# Patient Record
Sex: MALE | Race: WHITE | NOT HISPANIC OR LATINO | Employment: FULL TIME | URBAN - NONMETROPOLITAN AREA
[De-identification: names, ages, dates, MRNs, and addresses within clinical notes are randomized per-mention and may not be internally consistent; named-entity substitution may affect disease eponyms.]

---

## 2017-02-08 ENCOUNTER — OFFICE VISIT (OUTPATIENT)
Dept: FAMILY MEDICINE | Facility: OTHER | Age: 32
End: 2017-02-08
Payer: COMMERCIAL

## 2017-02-08 VITALS
BODY MASS INDEX: 41.75 KG/M2 | HEIGHT: 73 IN | SYSTOLIC BLOOD PRESSURE: 136 MMHG | WEIGHT: 315 LBS | RESPIRATION RATE: 16 BRPM | HEART RATE: 72 BPM | TEMPERATURE: 96.4 F | DIASTOLIC BLOOD PRESSURE: 86 MMHG

## 2017-02-08 DIAGNOSIS — R80.9 PROTEINURIA: ICD-10-CM

## 2017-02-08 DIAGNOSIS — E66.01 MORBID OBESITY WITH BMI OF 45.0-49.9, ADULT (H): ICD-10-CM

## 2017-02-08 DIAGNOSIS — I10 HTN, GOAL BELOW 140/90: ICD-10-CM

## 2017-02-08 DIAGNOSIS — R81 GLUCOSURIA: ICD-10-CM

## 2017-02-08 DIAGNOSIS — E11.8 TYPE 2 DIABETES MELLITUS WITH COMPLICATION, WITHOUT LONG-TERM CURRENT USE OF INSULIN (H): Primary | ICD-10-CM

## 2017-02-08 DIAGNOSIS — R80.9 MICROALBUMINURIA: ICD-10-CM

## 2017-02-08 DIAGNOSIS — E78.5 HYPERLIPIDEMIA LDL GOAL <100: ICD-10-CM

## 2017-02-08 DIAGNOSIS — J34.89 SORE IN NOSE: ICD-10-CM

## 2017-02-08 LAB
ALBUMIN SERPL-MCNC: 3.9 G/DL (ref 3.4–5)
ALP SERPL-CCNC: 61 U/L (ref 40–150)
ALT SERPL W P-5'-P-CCNC: 37 U/L (ref 0–70)
ANION GAP SERPL CALCULATED.3IONS-SCNC: 3 MMOL/L (ref 3–14)
AST SERPL W P-5'-P-CCNC: 21 U/L (ref 0–45)
BILIRUB SERPL-MCNC: 0.8 MG/DL (ref 0.2–1.3)
BUN SERPL-MCNC: 15 MG/DL (ref 7–30)
CALCIUM SERPL-MCNC: 9.2 MG/DL (ref 8.5–10.1)
CHLORIDE SERPL-SCNC: 105 MMOL/L (ref 94–109)
CHOLEST SERPL-MCNC: 189 MG/DL
CO2 SERPL-SCNC: 33 MMOL/L (ref 20–32)
CREAT SERPL-MCNC: 0.97 MG/DL (ref 0.66–1.25)
CREAT UR-MCNC: 85 MG/DL
GFR SERPL CREATININE-BSD FRML MDRD: 89 ML/MIN/1.7M2
GLUCOSE SERPL-MCNC: 99 MG/DL (ref 70–99)
HBA1C MFR BLD: 6.3 % (ref 4.3–6)
HDLC SERPL-MCNC: 46 MG/DL
LDLC SERPL CALC-MCNC: 111 MG/DL
MICROALBUMIN UR-MCNC: 309 MG/L
MICROALBUMIN/CREAT UR: 362.25 MG/G CR (ref 0–17)
NONHDLC SERPL-MCNC: 143 MG/DL
POTASSIUM SERPL-SCNC: 4.2 MMOL/L (ref 3.4–5.3)
PROT SERPL-MCNC: 7.2 G/DL (ref 6.8–8.8)
SODIUM SERPL-SCNC: 141 MMOL/L (ref 133–144)
TRIGL SERPL-MCNC: 159 MG/DL
TSH SERPL DL<=0.005 MIU/L-ACNC: 3.99 MU/L (ref 0.4–4)

## 2017-02-08 PROCEDURE — 82043 UR ALBUMIN QUANTITATIVE: CPT | Performed by: PHYSICIAN ASSISTANT

## 2017-02-08 PROCEDURE — 83036 HEMOGLOBIN GLYCOSYLATED A1C: CPT | Performed by: PHYSICIAN ASSISTANT

## 2017-02-08 PROCEDURE — 80061 LIPID PANEL: CPT | Performed by: PHYSICIAN ASSISTANT

## 2017-02-08 PROCEDURE — 99214 OFFICE O/P EST MOD 30 MIN: CPT | Performed by: PHYSICIAN ASSISTANT

## 2017-02-08 PROCEDURE — 36415 COLL VENOUS BLD VENIPUNCTURE: CPT | Performed by: PHYSICIAN ASSISTANT

## 2017-02-08 PROCEDURE — 84443 ASSAY THYROID STIM HORMONE: CPT | Performed by: PHYSICIAN ASSISTANT

## 2017-02-08 PROCEDURE — 80053 COMPREHEN METABOLIC PANEL: CPT | Performed by: PHYSICIAN ASSISTANT

## 2017-02-08 RX ORDER — MUPIROCIN 20 MG/G
OINTMENT TOPICAL 3 TIMES DAILY
Qty: 22 G | Refills: 1 | Status: SHIPPED | OUTPATIENT
Start: 2017-02-08 | End: 2017-02-13

## 2017-02-08 RX ORDER — LOSARTAN POTASSIUM 25 MG/1
25 TABLET ORAL DAILY
Qty: 90 TABLET | Refills: 1 | Status: SHIPPED | OUTPATIENT
Start: 2017-02-08 | End: 2017-02-08

## 2017-02-08 RX ORDER — LOSARTAN POTASSIUM 25 MG/1
25 TABLET ORAL DAILY
Qty: 90 TABLET | Refills: 1 | Status: SHIPPED | OUTPATIENT
Start: 2017-02-08 | End: 2017-11-14

## 2017-02-08 RX ORDER — LOSARTAN POTASSIUM 25 MG/1
TABLET ORAL
COMMUNITY
End: 2017-02-08

## 2017-02-08 ASSESSMENT — PAIN SCALES - GENERAL: PAINLEVEL: NO PAIN (0)

## 2017-02-08 NOTE — PROGRESS NOTES
"  SUBJECTIVE:                                                    Edenilson Mccartney is a 31 year old male who presents to clinic today for the following health issues:      Diabetes Follow-up      Patient is checking blood sugars: rarely.  Results range are variable    Diabetic concerns: None     Symptoms of hypoglycemia (low blood sugar): none     Paresthesias (numbness or burning in feet) or sores: No     Date of last diabetic eye exam: due       Amount of exercise or physical activity: None    Problems taking medications regularly: No    Medication side effects: none    Diet: diabetic    Hyperlipidemia Follow-Up      Rate your low fat/cholesterol diet?: not monitoring fat    Taking statin?  No    Other lipid medications/supplements?:  none     Hypertension Follow-up      Outpatient blood pressures are not being checked.    Low Salt Diet: not monitoring salt       Problem list and histories reviewed & adjusted, as indicated.  Additional history: as documented    Patient Active Problem List   Diagnosis     Proteinuria     Hyperlipidemia LDL goal <100     Type 2 diabetes mellitus with complication (H)     Obesity (BMI 30-39.9)     HTN, goal below 140/90     Microalbuminuria     History reviewed. No pertinent past surgical history.    Social History   Substance Use Topics     Smoking status: Never Smoker      Smokeless tobacco: Not on file     Alcohol Use: 0.0 oz/week      Comment: occasional     History reviewed. No pertinent family history.        ROS:  Constitutional, HEENT, cardiovascular, pulmonary, GI, , musculoskeletal, neuro, skin, endocrine and psych systems are negative, except as otherwise noted.    OBJECTIVE:                                                    /86 mmHg  Pulse 72  Temp(Src) 96.4  F (35.8  C) (Oral)  Resp 16  Ht 6' 1\" (1.854 m)  Wt 349 lb 8 oz (158.532 kg)  BMI 46.12 kg/m2  Body mass index is 46.12 kg/(m^2).  GENERAL: healthy, alert and no distress  HENT: ear canals and TM's " "normal, nose and mouth without ulcers or lesions  NECK: no adenopathy, no asymmetry, masses, or scars and trachea midline and normal to palpation  RESP: lungs clear to auscultation - no rales, rhonchi or wheezes  CV: regular rate and rhythm, normal S1 S2, no S3 or S4, no murmur, click or rub, no peripheral edema and peripheral pulses strong  ABDOMEN: soft, nontender, no hepatosplenomegaly, no masses and bowel sounds normal  MS: no gross musculoskeletal defects noted, no edema  SKIN: no suspicious lesions or rashes  NEURO: Normal strength and tone, mentation intact and speech normal  PSYCH: mentation appears normal, affect normal/bright  Diabetic foot exam: normal DP and PT pulses, no trophic changes or ulcerative lesions and normal sensory exam    Diagnostic Test Results:  No results found for this or any previous visit (from the past 24 hour(s)).     ASSESSMENT/PLAN:                                                    Diabetes Type II, A1c Controlled, non-insulin dependent   Associated with the following complications    Renal Complications:  None    Ophthalmologic Complications: None    Neurologic Complications: None    Peripheral Vascular Complications:  None    Other: None   Plan:  No changes in the patient's current treatment plan    Hyperlipidemia; control uncertian   Plan:  Labs:   Lipid    Hypertension; Marginal control   Associated with the following complications:    Diabetes   Plan:  Medications:     ACE/ARB - Cozaar 25 mg by mouth daily        BMI:   Estimated body mass index is 46.12 kg/(m^2) as calculated from the following:    Height as of this encounter: 6' 1\" (1.854 m).    Weight as of this encounter: 349 lb 8 oz (158.532 kg).   Weight management plan: Discussed healthy diet and exercise guidelines and patient will follow up in 6 months in clinic to re-evaluate.    (E11.8) Type 2 diabetes mellitus with complication, without long-term current use of insulin (H)  (primary encounter diagnosis)  Comment: " Excellent control today no change in current recommendations  Plan: Hemoglobin A1c, Comprehensive metabolic panel,         Lipid Profile with reflex to direct LDL, TSH         with free T4 reflex, Albumin Random Urine         Quantitative, metFORMIN (GLUCOPHAGE) 500 MG         tablet        Return office visit in 6 months    (R80.9) Microalbuminuria  Comment: Labs pending  Plan: Albumin Random Urine Quantitative        Adjust medications based on results    (I10) HTN, goal below 140/90  Comment: Fair control today.  Plan: Comprehensive metabolic panel, Lipid Profile         with reflex to direct LDL, losartan (COZAAR) 25        MG tablet, DISCONTINUED: losartan (COZAAR) 25         MG tablet        Increase medications to 25 mg daily and recheck in 2 weeks.    (E78.5) Hyperlipidemia LDL goal <100  Comment: Results pending  Plan: Comprehensive metabolic panel, Lipid Profile         with reflex to direct LDL        Adjust medication based on results    (J34.89) Sore in nose  Comment: I do not see this or that he is talking about today upon exam. Advised that he trial medication as noted below.  Plan: mupirocin (BACTROBAN) 2 % ointment        As directed.    (E66.01,  Z68.42) Morbid obesity with BMI of 45.0-49.9, adult (H)  Comment: Prolonged discussion regards to next steps related to a BMI greater than 45 and the presence of diabetes. Advised that he take a careful look at this and make a decision as to what his goals are. Advised weight reduction programs like Lares diet or weight watcher's be considered.  Plan: As directed return office visit in 6 months to check on progress.    Work on weight loss  Regular exercise  6 months    Russ Carrington PA-C  [facility]

## 2017-02-08 NOTE — MR AVS SNAPSHOT
"              After Visit Summary   2/8/2017    Edenilson Mccartney    MRN: [id number]           Patient Information     Date Of Birth          1985        Visit Information        Provider Department      2/8/2017 11:20 AM Russ Juares PA-C [facility]        Today's Diagnoses     Microalbuminuria    -  1     Obesity (BMI 30-39.9)         HTN, goal below 140/90         Hyperlipidemia LDL goal <100         Type 2 diabetes mellitus with complication, without long-term current use of insulin (H)         Sore in nose         Proteinuria         Glucosuria            Follow-ups after your visit        Follow-up notes from your care team     Return in about 2 weeks (around 2/22/2017) for htn recheck.      Who to contact     If you have questions or need follow up information about today's clinic visit or your schedule please contact [facility] directly at [phone].  Normal or non-critical lab and imaging results will be communicated to you by MyChart, letter or phone within 4 business days after the clinic has received the results. If you do not hear from us within 7 days, please contact the clinic through Babyagehart or phone. If you have a critical or abnormal lab result, we will notify you by phone as soon as possible.  Submit refill requests through Startapp or call your pharmacy and they will forward the refill request to us. Please allow 3 business days for your refill to be completed.          Additional Information About Your Visit        MyChart Information     Startapp lets you send messages to your doctor, view your test results, renew your prescriptions, schedule appointments and more. To sign up, go to [email or web address] . Click on \"Log in\" on the left side of the screen, which will take you to the Welcome page. Then click on \"Sign up Now\" on the right side of the page.     You will be asked to enter the access code listed below, as well as some personal information. Please " "follow the directions to create your username and password.     Your access code is: [email or web address]  Expires: 2017 11:54 AM     Your access code will  in 90 days. If you need help or a new code, please call your [facility] clinic or [phone].        Care EveryWhere ID     This is your Care EveryWhere ID. This could be used by other organizations to access your [facility] medical records  [id number]        Your Vitals Were     Pulse Temperature Respirations Height BMI (Body Mass Index)       72 96.4  F (35.8  C) (Oral) 16 6' 1\" (1.854 m) 46.12 kg/m2        Blood Pressure from Last 3 Encounters:   17 136/86   16 142/90   16 150/88    Weight from Last 3 Encounters:   17 349 lb 8 oz (158.532 kg)   16 345 lb (156.491 kg)   08/05/15 302 lb 8 oz (137.213 kg)              We Performed the Following     Albumin Random Urine Quantitative     Comprehensive metabolic panel     Hemoglobin A1c     Lipid Profile with reflex to direct LDL     TSH with free T4 reflex          Today's Medication Changes          These changes are accurate as of: 17 11:54 AM.  If you have any questions, ask your nurse or doctor.               Start taking these medicines.        Dose/Directions    losartan 25 MG tablet   Commonly known as:  COZAAR   Used for:  HTN, goal below 140/90   Started by:  Russ Juares PA-C        Dose:  25 mg   Take 1 tablet (25 mg) by mouth daily   Quantity:  90 tablet   Refills:  1       mupirocin 2 % ointment   Commonly known as:  BACTROBAN   Used for:  Sore in nose   Started by:  Russ Juares PA-C        Apply topically 3 times daily for 5 days   Quantity:  22 g   Refills:  1            Where to get your medicines      These medications were sent to [facility] [place], [place] - [address]   [address] [facility] [address]     Phone:  [phone]    - losartan 25 MG tablet  - metFORMIN 500 MG tablet  - mupirocin 2 % ointment             Primary Care Provider    " Physician No Ref-Primary       No address on file        Thank you!     Thank you for choosing [facility]  for your care. Our goal is always to provide you with excellent care. Hearing back from our patients is one way we can continue to improve our services. Please take a few minutes to complete the written survey that you may receive in the mail after your visit with us. Thank you!             Your Updated Medication List - Protect others around you: Learn how to safely use, store and throw away your medicines at www.disposemymeds.org.          This list is accurate as of: 2/8/17 11:54 AM.  Always use your most recent med list.                   Brand Name Dispense Instructions for use    ASPIRIN NOT PRESCRIBED    INTENTIONAL     Antiplatelet medication not prescribed intentionally due to Not indicated based on age       blood glucose monitoring lancets     1 Box    Use to test blood sugar one time daily or as directed.       blood glucose monitoring test strip    ACCU-CHEK SMARTVIEW    100 strip    Use to test blood sugar one time daily or as directed.       losartan 25 MG tablet    COZAAR    90 tablet    Take 1 tablet (25 mg) by mouth daily       metFORMIN 500 MG tablet    GLUCOPHAGE    90 tablet    Take 0.5 tablets (250 mg) by mouth 2 times daily (with meals)       mupirocin 2 % ointment    BACTROBAN    22 g    Apply topically 3 times daily for 5 days       STATIN NOT PRESCRIBED (INTENTIONAL)      Statin not prescribed intentionally due to Not indicated based on age

## 2017-02-08 NOTE — NURSING NOTE
"Chief Complaint   Patient presents with     Diabetes       Initial /92 mmHg  Pulse 72  Temp(Src) 96.4  F (35.8  C) (Oral)  Resp 16  Ht 6' 1\" (1.854 m)  Wt 349 lb 8 oz (158.532 kg)  BMI 46.12 kg/m2 Estimated body mass index is 46.12 kg/(m^2) as calculated from the following:    Height as of this encounter: 6' 1\" (1.854 m).    Weight as of this encounter: 349 lb 8 oz (158.532 kg).  Medication Reconciliation: complete       Sarah MONZON LPN      Large blood pressure cuff    Screening Questionnaire for Adult Immunization    Are you sick today?   No   Do you have allergies to medications, food, a vaccine component or latex?   No   Have you ever had a serious reaction after receiving a vaccination?   No   Do you have a long-term health problem with heart disease, lung disease, asthma, kidney disease, metabolic disease (e.g. diabetes), anemia, or other blood disorder?   Yes   Do you have cancer, leukemia, HIV/AIDS, or any other immune system problem?   No   In the past 3 months, have you taken medications that affect  your immune system, such as prednisone, other steroids, or anticancer drugs; drugs for the treatment of rheumatoid arthritis, Crohn s disease, or psoriasis; or have you had radiation treatments?   No   Have you had a seizure, or a brain or other nervous system problem?   No   During the past year, have you received a transfusion of blood or blood     products, or been given immune (gamma) globulin or antiviral drug?   No   For women: Are you pregnant or is there a chance you could become        pregnant during the next month?   No   Have you received any vaccinations in the past 4 weeks?   No     Immunization questionnaire was positive for at least one answer.  Notified Russ Carrington PA-C.      MNVFC doesn't apply on this patient    Per orders of  , injection of  given by Sarah Paredes. Patient instructed to remain in clinic for 20 minutes afterwards, and to report any adverse reaction to me " immediately.       Screening performed by Sarah Paredes on 2/8/2017 at 11:22 AM.

## 2017-02-10 NOTE — PROGRESS NOTES
Quick Note:    Contacted patient with results. Patient had no questions or concerns at this time. Mahogany Tellez MA 2/10/2017      ______

## 2017-02-24 ENCOUNTER — ALLIED HEALTH/NURSE VISIT (OUTPATIENT)
Dept: FAMILY MEDICINE | Facility: OTHER | Age: 32
End: 2017-02-24
Payer: COMMERCIAL

## 2017-02-24 VITALS — HEART RATE: 74 BPM | DIASTOLIC BLOOD PRESSURE: 84 MMHG | SYSTOLIC BLOOD PRESSURE: 136 MMHG

## 2017-02-24 DIAGNOSIS — I10 HTN, GOAL BELOW 140/90: Primary | ICD-10-CM

## 2017-02-24 PROCEDURE — 99207 ZZC NO CHARGE NURSE ONLY: CPT

## 2017-02-24 NOTE — NURSING NOTE
Edenilson is a 31 year old male who comes in today for a blood pressure check because of new medication.  Patient is taking medication as prescribed  Patient is tolerating medications well.  Current complaints: none  Patient is not monitoring Blood Pressure elsewhere.      Vitals as recorded, a large cuff was used.  left arm  BP Readings from Last 4 Encounters:   02/24/17 136/84   02/08/17 136/86   06/03/16 142/90   05/25/16 150/88     /84  Pulse 74      Health Maintenance Due   Topic Date Due     FOOT EXAM Q1 YEAR( NO INBASKET)  02/27/1985     EYE EXAM Q1 YEAR( NO INBASKET)  02/27/1985     PNEUMOVAX 1X HI RISK PATIENT < 65 (NO IB MSG)  02/27/1986     INFLUENZA VACCINE (SYSTEM ASSIGNED)  09/01/2016     Mahogany Tellez MA     2/24/2017

## 2017-02-24 NOTE — MR AVS SNAPSHOT
After Visit Summary   2/24/2017    Edenilson Mccartney    MRN: [id number]           Patient Information     Date Of Birth          1985        Visit Information        Provider Department      2/24/2017 1:30 PM TEMI GRACE NURSE, [facility]        Today's Diagnoses     HTN, goal below 140/90    -  1       Follow-ups after your visit        Your next 10 appointments already scheduled     Feb 28, 2017  9:30 AM CST   Nurse Only with TEMI GRACE NURSE, [facility])    [address] [facility] [id number] [phone]            Aug 07, 2017  7:20 AM CDT   Office Visit with Russ Juares PA-C   [facility]    [address] [facility] [address] [phone]           Bring a current list of meds and any records pertaining to this visit.  For Physicals, please bring immunization records and any forms needing to be filled out.  Please arrive 10 minutes early to complete paperwork.              Who to contact     If you have questions or need follow up information about today's clinic visit or your schedule please contact [facility] directly at [phone].  Normal or non-critical lab and imaging results will be communicated to you by MyChart, letter or phone within 4 business days after the clinic has received the results. If you do not hear from us within 7 days, please contact the clinic through Sovihart or phone. If you have a critical or abnormal lab result, we will notify you by phone as soon as possible.  Submit refill requests through Concard or call your pharmacy and they will forward the refill request to us. Please allow 3 business days for your refill to be completed.          Additional Information About Your Visit        SoviharVuzix Information     Concard lets you send messages to your doctor, view your test results, renew your prescriptions, schedule appointments and more. To sign  "up, go to [email or web address] . Click on \"Log in\" on the left side of the screen, which will take you to the Welcome page. Then click on \"Sign up Now\" on the right side of the page.     You will be asked to enter the access code listed below, as well as some personal information. Please follow the directions to create your username and password.     Your access code is: [email or web address]  Expires: 2017 11:54 AM     Your access code will  in 90 days. If you need help or a new code, please call your [facility] clinic or [phone].        Care EveryWhere ID     This is your Care EveryWhere ID. This could be used by other organizations to access your [facility] medical records  [id number]        Your Vitals Were     Pulse                   74            Blood Pressure from Last 3 Encounters:   17 136/84   17 136/86   16 142/90    Weight from Last 3 Encounters:   17 (!) 349 lb 8 oz (158.5 kg)   16 (!) 345 lb (156.5 kg)   08/05/15 (!) 302 lb 8 oz (137.2 kg)              Today, you had the following     No orders found for display       Primary Care Provider    Physician No Ref-Primary       No address on file        Thank you!     Thank you for choosing [facility]  for your care. Our goal is always to provide you with excellent care. Hearing back from our patients is one way we can continue to improve our services. Please take a few minutes to complete the written survey that you may receive in the mail after your visit with us. Thank you!             Your Updated Medication List - Protect others around you: Learn how to safely use, store and throw away your medicines at www.disposemymeds.org.          This list is accurate as of: 17  1:52 PM.  Always use your most recent med list.                   Brand Name Dispense Instructions for use    ASPIRIN NOT PRESCRIBED    INTENTIONAL     Antiplatelet medication not prescribed intentionally due to Not indicated based on " age       blood glucose monitoring lancets     1 Box    Use to test blood sugar one time daily or as directed.       blood glucose monitoring test strip    ACCU-CHEK SMARTVIEW    100 strip    Use to test blood sugar one time daily or as directed.       losartan 25 MG tablet    COZAAR    90 tablet    Take 1 tablet (25 mg) by mouth daily       metFORMIN 500 MG tablet    GLUCOPHAGE    90 tablet    Take 0.5 tablets (250 mg) by mouth 2 times daily (with meals)       STATIN NOT PRESCRIBED (INTENTIONAL)      Statin not prescribed intentionally due to Not indicated based on age

## 2017-02-28 ENCOUNTER — ALLIED HEALTH/NURSE VISIT (OUTPATIENT)
Dept: FAMILY MEDICINE | Facility: OTHER | Age: 32
End: 2017-02-28
Payer: COMMERCIAL

## 2017-02-28 DIAGNOSIS — Z23 NEED FOR PROPHYLACTIC VACCINATION AND INOCULATION AGAINST INFLUENZA: Primary | ICD-10-CM

## 2017-02-28 PROCEDURE — 90472 IMMUNIZATION ADMIN EACH ADD: CPT

## 2017-02-28 PROCEDURE — 90686 IIV4 VACC NO PRSV 0.5 ML IM: CPT

## 2017-02-28 PROCEDURE — 90471 IMMUNIZATION ADMIN: CPT

## 2017-02-28 PROCEDURE — 99207 ZZC NO CHARGE NURSE ONLY: CPT

## 2017-02-28 PROCEDURE — 90732 PPSV23 VACC 2 YRS+ SUBQ/IM: CPT

## 2017-02-28 NOTE — MR AVS SNAPSHOT
"              After Visit Summary   2/28/2017    Edenilson Mccartney    MRN: [id number]           Patient Information     Date Of Birth          1985        Visit Information        Provider Department      2/28/2017 9:30 AM TEMI GRACE NURSE, [facility]        Today's Diagnoses     Need for prophylactic vaccination and inoculation against influenza    -  1       Follow-ups after your visit        Your next 10 appointments already scheduled     Aug 07, 2017  7:20 AM CDT   Office Visit with Russ Juares PA-C   [facility]    [address] [facility] [id number] [phone]           Bring a current list of meds and any records pertaining to this visit.  For Physicals, please bring immunization records and any forms needing to be filled out.  Please arrive 10 minutes early to complete paperwork.              Who to contact     If you have questions or need follow up information about today's clinic visit or your schedule please contact [facility] directly at [phone].  Normal or non-critical lab and imaging results will be communicated to you by Monkimunhart, letter or phone within 4 business days after the clinic has received the results. If you do not hear from us within 7 days, please contact the clinic through Mobibao Technologyt or phone. If you have a critical or abnormal lab result, we will notify you by phone as soon as possible.  Submit refill requests through Anita Margarita or call your pharmacy and they will forward the refill request to us. Please allow 3 business days for your refill to be completed.          Additional Information About Your Visit        MonkimunharDream Industries Information     Anita Margarita lets you send messages to your doctor, view your test results, renew your prescriptions, schedule appointments and more. To sign up, go to [email or web address] . Click on \"Log in\" on the left side of the screen, which will take you to the Welcome page. Then click on " "\"Sign up Now\" on the right side of the page.     You will be asked to enter the access code listed below, as well as some personal information. Please follow the directions to create your username and password.     Your access code is: [email or web address]  Expires: 2017 11:54 AM     Your access code will  in 90 days. If you need help or a new code, please call your [facility] or [phone].        Care EveryWhere ID     This is your Care EveryWhere ID. This could be used by other organizations to access your [facility] medical records  [id number]         Blood Pressure from Last 3 Encounters:   17 136/84   17 136/86   16 142/90    Weight from Last 3 Encounters:   17 (!) 349 lb 8 oz (158.5 kg)   16 (!) 345 lb (156.5 kg)   08/05/15 (!) 302 lb 8 oz (137.2 kg)              We Performed the Following     FLU VAC, SPLIT VIRUS IM > 3 YO (QUADRIVALENT) [48112]     PNEUMOVOCCAL VACCINE 23 VALENT (PNEUMOVAX 23) >=2 YRS  [09938]     Vaccine Administration, Each Additional [06918]     Vaccine Administration, Initial [12658]        Primary Care Provider    None Specified       No primary provider on file.        Thank you!     Thank you for choosing [facility]  for your care. Our goal is always to provide you with excellent care. Hearing back from our patients is one way we can continue to improve our services. Please take a few minutes to complete the written survey that you may receive in the mail after your visit with us. Thank you!             Your Updated Medication List - Protect others around you: Learn how to safely use, store and throw away your medicines at www.disposemymeds.org.          This list is accurate as of: 17 10:05 AM.  Always use your most recent med list.                   Brand Name Dispense Instructions for use    ASPIRIN NOT PRESCRIBED    INTENTIONAL     Antiplatelet medication not prescribed intentionally due to Not indicated based on age       " blood glucose monitoring lancets     1 Box    Use to test blood sugar one time daily or as directed.       blood glucose monitoring test strip    ACCU-CHEK SMARTVIEW    100 strip    Use to test blood sugar one time daily or as directed.       losartan 25 MG tablet    COZAAR    90 tablet    Take 1 tablet (25 mg) by mouth daily       metFORMIN 500 MG tablet    GLUCOPHAGE    90 tablet    Take 0.5 tablets (250 mg) by mouth 2 times daily (with meals)       STATIN NOT PRESCRIBED (INTENTIONAL)      Statin not prescribed intentionally due to Not indicated based on age

## 2017-02-28 NOTE — PROGRESS NOTES
Injectable Influenza Immunization Documentation    1.  Is the person to be vaccinated sick today?  No    2. Does the person to be vaccinated have an allergy to eggs or to a component of the vaccine?  No    3. Has the person to be vaccinated today ever had a serious reaction to influenza vaccine in the past?  No    4. Has the person to be vaccinated ever had Guillain-R[place] syndrome?  No     Form completed by Mahogany Tellez MA     2/28/2017

## 2017-02-28 NOTE — NURSING NOTE
Screening Questionnaire for Adult Immunization    Are you sick today?   No   Do you have allergies to medications, food, a vaccine component or latex?   No   Have you ever had a serious reaction after receiving a vaccination?   No   Do you have a long-term health problem with heart disease, lung disease, asthma, kidney disease, metabolic disease (e.g. diabetes), anemia, or other blood disorder?   No   Do you have cancer, leukemia, HIV/AIDS, or any other immune system problem?   No   In the past 3 months, have you taken medications that affect  your immune system, such as prednisone, other steroids, or anticancer drugs; drugs for the treatment of rheumatoid arthritis, Crohn s disease, or psoriasis; or have you had radiation treatments?   No   Have you had a seizure, or a brain or other nervous system problem?   No   During the past year, have you received a transfusion of blood or blood     products, or been given immune (gamma) globulin or antiviral drug?   No   For women: Are you pregnant or is there a chance you could become        pregnant during the next month?   No   Have you received any vaccinations in the past 4 weeks?   No     Immunization questionnaire answers were all negative.      MNVFC doesn't apply on this patient    Prior to injection verified patient identity using patient's name and date of birth.  Patient instructed to remain in clinic for 20 minutes afterwards, and to report any adverse reaction to me immediately.       Screening performed by Mahogany Tellez on 2/28/2017 at 9:58 AM.

## 2017-10-10 ENCOUNTER — OFFICE VISIT (OUTPATIENT)
Dept: URGENT CARE | Facility: RETAIL CLINIC | Age: 32
End: 2017-10-10
Payer: COMMERCIAL

## 2017-10-10 VITALS
OXYGEN SATURATION: 98 % | DIASTOLIC BLOOD PRESSURE: 101 MMHG | TEMPERATURE: 98.1 F | SYSTOLIC BLOOD PRESSURE: 164 MMHG | HEART RATE: 86 BPM

## 2017-10-10 DIAGNOSIS — I10 HTN, GOAL BELOW 140/90: ICD-10-CM

## 2017-10-10 DIAGNOSIS — J02.9 ACUTE PHARYNGITIS, UNSPECIFIED ETIOLOGY: Primary | ICD-10-CM

## 2017-10-10 LAB — S PYO AG THROAT QL IA.RAPID: NORMAL

## 2017-10-10 PROCEDURE — 87081 CULTURE SCREEN ONLY: CPT | Performed by: NURSE PRACTITIONER

## 2017-10-10 PROCEDURE — 87880 STREP A ASSAY W/OPTIC: CPT | Mod: QW | Performed by: NURSE PRACTITIONER

## 2017-10-10 PROCEDURE — 99213 OFFICE O/P EST LOW 20 MIN: CPT | Performed by: NURSE PRACTITIONER

## 2017-10-10 NOTE — PROGRESS NOTES
[facility] note    SUBJECTIVE:  Edenilson Mccartney is a 32 year old male who presents to [facility] clinic with chief complaint of sore throat.    Onset of symptoms was 1 day(s) ago.    Course of illness: sudden onset.    Severity mild  Course of illness:  Current and Associated symptoms: stuffy nose, sore throat, hoarse voice, facial pain/pressure and headache  Treatment measures tried at home include None tried.  Predisposing factors include exposure to strep.    Current Outpatient Prescriptions   Medication     losartan (COZAAR) 25 MG tablet     metFORMIN (GLUCOPHAGE) 500 MG tablet     STATIN NOT PRESCRIBED, INTENTIONAL,     ASPIRIN NOT PRESCRIBED, INTENTIONAL,     blood glucose monitoring (ACCU-CHEK FASTCLIX) lancets     blood glucose (ACCU-CHEK SMARTVIEW) test strip     No current facility-administered medications for this visit.      PAST MEDICAL HISTORY:   Past Medical History:   Diagnosis Date     Diabetes (H)      HTN, goal below 140/90 8/5/2015     Hyperlipidemia LDL goal <100 2/25/2015     Microalbuminuria 10/14/2015     Morbid obesity with BMI of 45.0-49.9, adult (H) 2/25/2015     Obesity (BMI 30-39.9) 8/5/2015     Other acne     Acne     Proteinuria 2/25/2015     Type 2 diabetes mellitus with complication, without long-term current use of insulin (H) 2/8/2017     Type 2 diabetes, HbA1c goal < 7% (H) 2/25/2015       PAST SURGICAL HISTORY: No past surgical history on file.    FAMILY HISTORY: No family history on file.    SOCIAL HISTORY:   Social History   Substance Use Topics     Smoking status: Never Smoker     Smokeless tobacco: Not on file     Alcohol use 0.0 oz/week      Comment: occasional     ROS:  Review of systems negative except as stated above.    OBJECTIVE:   Vitals:    10/10/17 1827   BP: (!) 164/101   Pulse: 86   Temp: 98.1  F (36.7  C)   TempSrc: Oral   SpO2: 98%     GENERAL APPEARANCE: alert, active, mild distress, cooperative and over  weight  EYES: EOMI,  PERRL, conjunctiva clear  HENT: ear canals and TM's normal.  Nose normal.  oral mucous membranes moist, no erythema noted  NECK: bilateral anterior cervical adenopathy  RESP: lungs clear to auscultation - no rales, rhonchi or wheezes  CV: regular rates and rhythm, normal S1 S2, no murmur noted  ABDOMEN:  soft, nontender, no HSM or masses and bowel sounds normal  SKIN: no suspicious lesions or rashes  NEURO: Normal strength and tone, sensory exam grossly normal,  normal speech and mentation    Rapid Strep test is negative; await throat culture results.    ASSESSMENT:  Acute pharyngitis, unspecified etiology      PLAN:   Outpatient Encounter Prescriptions as of 10/10/2017   Medication Sig Dispense Refill     losartan (COZAAR) 25 MG tablet Take 1 tablet (25 mg) by mouth daily (Patient not taking: Reported on 10/10/2017) 90 tablet 1     metFORMIN (GLUCOPHAGE) 500 MG tablet Take 0.5 tablets (250 mg) by mouth 2 times daily (with meals) (Patient not taking: Reported on 10/10/2017) 90 tablet 1     STATIN NOT PRESCRIBED, INTENTIONAL, Statin not prescribed intentionally due to Not indicated based on age       ASPIRIN NOT PRESCRIBED, INTENTIONAL, Antiplatelet medication not prescribed intentionally due to Not indicated based on age       blood glucose monitoring (ACCU-CHEK FASTCLIX) lancets Use to test blood sugar one time daily or as directed. (Patient not taking: Reported on 10/10/2017) 1 Box prn     blood glucose (ACCU-CHEK SMARTVIEW) test strip Use to test blood sugar one time daily or as directed. (Patient not taking: Reported on 10/10/2017) 100 strip prn     No facility-administered encounter medications on file as of 10/10/2017.    Blood Pressure reviewed & discussed, Edenilson Mccartney advised to F/U with PCP.    If not improving Follow up at:  [facility] [phone]  Encourage good hydration (mainly water), may drink tea /c honey, warm chicken broth to sooth throat.  Soft  foods may be preferred for several days.  Symptomatic treatment with warm Na+ H2O gargles, OTC analgesic, etc. discussed.   Strep culture pending.   Edenilson Mccartney told positive cultures called only.  Rest as needed.  Follow-up with primary care provider if not improving.    If difficulty breathing or swallowing be seen immediately in the ED.    Toby Young MSN, APRN, Family NP-C  [facility]

## 2017-10-10 NOTE — MR AVS SNAPSHOT
"              After Visit Summary   10/10/2017    Edenilson Mccartney    MRN: [id number]           Patient Information     Date Of Birth          1985        Visit Information        Provider Department      10/10/2017 6:30 PM Toby Young, TREY [facility]        Today's Diagnoses     Acute pharyngitis, unspecified etiology    -  1    HTN, goal below 140/90           Follow-ups after your visit        Who to contact     You can reach your care team any time of the day by calling [phone].  Notification of test results:  If you have an abnormal lab result, we will notify you by phone as soon as possible.         Additional Information About Your Visit        MyChart Information     [employer] lets you send messages to your doctor, view your test results, renew your prescriptions, schedule appointments and more. To sign up, go to [email or web address] . Click on \"Log in\" on the left side of the screen, which will take you to the Welcome page. Then click on \"Sign up Now\" on the right side of the page.     You will be asked to enter the access code listed below, as well as some personal information. Please follow the directions to create your username and password.     Your access code is: [email or web address]  Expires: 2018  6:44 PM     Your access code will  in 90 days. If you need help or a new code, please call your [place] clinic or [phone].        Care EveryWhere ID     This is your [facility] EveryWhere ID. This could be used by other organizations to access your [place] medical records  [id number]        Your Vitals Were     Pulse Temperature Pulse Oximetry             86 98.1  F (36.7  C) (Oral) 98%          Blood Pressure from Last 3 Encounters:   10/10/17 (!) 164/101   17 136/84   17 136/86    Weight from Last 3 Encounters:   17 (!) 349 lb 8 oz (158.5 kg)   16 (!) 345 lb (156.5 kg)   08/05/15 (!) 302 lb 8 oz (137.2 kg)              We Performed " the Following     BETA STREP GROUP A R/O CULTURE     RAPID STREP SCREEN        Primary Care Provider Office Phone # Fax #    [facility] [phone] [address] [facility] [address]        Equal Access to Services     NIKO VIRK : Haddelta marcy ku biancao Soomaali, waaxda luqadaha, qaybta kaalmada adeegyada, waxsaba garcian su hurley laJohannapravin broussard. So [facility] [phone].    ATENCIÓN: Si habla español, tiene a lewis disposición servicios gratuitos de asistencia lingüística. Llame al [phone].    We comply with applicable federal civil rights laws and [place] laws. We do not discriminate on the basis of race, color, national origin, age, disability, sex, sexual orientation, or gender identity.            Thank you!     Thank you for choosing [facility]  for your care. Our goal is always to provide you with excellent care. Hearing back from our patients is one way we can continue to improve our services. Please take a few minutes to complete the written survey that you may receive in the mail after your visit with us. Thank you!             Your Updated Medication List - Protect others around you: Learn how to safely use, store and throw away your medicines at www.disposemymeds.org.          This list is accurate as of: 10/10/17  6:44 PM.  Always use your most recent med list.                   Brand Name Dispense Instructions for use Diagnosis    ASPIRIN NOT PRESCRIBED    INTENTIONAL     Antiplatelet medication not prescribed intentionally due to Not indicated based on age        blood glucose monitoring lancets     1 Box    Use to test blood sugar one time daily or as directed.    Type 2 diabetes, HbA1c goal < 7% (H)       blood glucose monitoring test strip    ACCU-CHEK SMARTVIEW    100 strip    Use to test blood sugar one time daily or as directed.    Type 2 diabetes, HbA1c goal < 7% (H)       losartan 25 MG tablet    COZAAR    90 tablet    Take 1  tablet (25 mg) by mouth daily    HTN, goal below 140/90       metFORMIN 500 MG tablet    GLUCOPHAGE    90 tablet    Take 0.5 tablets (250 mg) by mouth 2 times daily (with meals)    Type 2 diabetes mellitus with complication, without long-term current use of insulin (H)       STATIN NOT PRESCRIBED (INTENTIONAL)      Statin not prescribed intentionally due to Not indicated based on age

## 2017-10-10 NOTE — NURSING NOTE
"Chief Complaint   Patient presents with     Pharyngitis     started today       Initial BP (!) 164/101  Pulse 86  Temp 98.1  F (36.7  C) (Oral)  SpO2 98% Estimated body mass index is 46.11 kg/(m^2) as calculated from the following:    Height as of 2/8/17: 6' 1\" (1.854 m).    Weight as of 2/8/17: 349 lb 8 oz (158.5 kg).  Medication Reconciliation: complete   Rin Junior      "

## 2017-10-12 LAB — BETA STREP CONFIRM: NORMAL

## 2017-10-27 ENCOUNTER — RADIANT APPOINTMENT (OUTPATIENT)
Dept: GENERAL RADIOLOGY | Facility: CLINIC | Age: 32
End: 2017-10-27
Attending: NURSE PRACTITIONER
Payer: COMMERCIAL

## 2017-10-27 ENCOUNTER — OFFICE VISIT (OUTPATIENT)
Dept: FAMILY MEDICINE | Facility: CLINIC | Age: 32
End: 2017-10-27
Payer: COMMERCIAL

## 2017-10-27 VITALS
WEIGHT: 315 LBS | TEMPERATURE: 98.7 F | RESPIRATION RATE: 14 BRPM | OXYGEN SATURATION: 98 % | BODY MASS INDEX: 46.27 KG/M2 | HEART RATE: 92 BPM | DIASTOLIC BLOOD PRESSURE: 66 MMHG | SYSTOLIC BLOOD PRESSURE: 124 MMHG

## 2017-10-27 DIAGNOSIS — R10.12 ABDOMINAL PAIN, LEFT UPPER QUADRANT: Primary | ICD-10-CM

## 2017-10-27 DIAGNOSIS — Z23 NEED FOR PROPHYLACTIC VACCINATION AND INOCULATION AGAINST INFLUENZA: ICD-10-CM

## 2017-10-27 DIAGNOSIS — E66.01 MORBID OBESITY WITH BMI OF 45.0-49.9, ADULT (H): ICD-10-CM

## 2017-10-27 DIAGNOSIS — E11.8 TYPE 2 DIABETES MELLITUS WITH COMPLICATION, WITHOUT LONG-TERM CURRENT USE OF INSULIN (H): ICD-10-CM

## 2017-10-27 LAB
ANION GAP SERPL CALCULATED.3IONS-SCNC: 4 MMOL/L (ref 3–14)
BUN SERPL-MCNC: 19 MG/DL (ref 7–30)
CALCIUM SERPL-MCNC: 8.8 MG/DL (ref 8.5–10.1)
CHLORIDE SERPL-SCNC: 102 MMOL/L (ref 94–109)
CO2 SERPL-SCNC: 32 MMOL/L (ref 20–32)
CREAT SERPL-MCNC: 1.09 MG/DL (ref 0.66–1.25)
ERYTHROCYTE [DISTWIDTH] IN BLOOD BY AUTOMATED COUNT: 13.5 % (ref 10–15)
GFR SERPL CREATININE-BSD FRML MDRD: 78 ML/MIN/1.7M2
GLUCOSE SERPL-MCNC: 117 MG/DL (ref 70–99)
HBA1C MFR BLD: 6.2 % (ref 4.3–6)
HCT VFR BLD AUTO: 40.1 % (ref 40–53)
HGB BLD-MCNC: 13.4 G/DL (ref 13.3–17.7)
LIPASE SERPL-CCNC: 241 U/L (ref 73–393)
MCH RBC QN AUTO: 28.9 PG (ref 26.5–33)
MCHC RBC AUTO-ENTMCNC: 33.4 G/DL (ref 31.5–36.5)
MCV RBC AUTO: 86 FL (ref 78–100)
PLATELET # BLD AUTO: 333 10E9/L (ref 150–450)
POTASSIUM SERPL-SCNC: 4.2 MMOL/L (ref 3.4–5.3)
RBC # BLD AUTO: 4.64 10E12/L (ref 4.4–5.9)
SODIUM SERPL-SCNC: 138 MMOL/L (ref 133–144)
WBC # BLD AUTO: 9.9 10E9/L (ref 4–11)

## 2017-10-27 PROCEDURE — 83036 HEMOGLOBIN GLYCOSYLATED A1C: CPT | Performed by: NURSE PRACTITIONER

## 2017-10-27 PROCEDURE — 90471 IMMUNIZATION ADMIN: CPT | Performed by: NURSE PRACTITIONER

## 2017-10-27 PROCEDURE — 90686 IIV4 VACC NO PRSV 0.5 ML IM: CPT | Performed by: NURSE PRACTITIONER

## 2017-10-27 PROCEDURE — 99214 OFFICE O/P EST MOD 30 MIN: CPT | Mod: 25 | Performed by: NURSE PRACTITIONER

## 2017-10-27 PROCEDURE — 74020 XR ABDOMEN 2 VW: CPT

## 2017-10-27 PROCEDURE — 80048 BASIC METABOLIC PNL TOTAL CA: CPT | Performed by: NURSE PRACTITIONER

## 2017-10-27 PROCEDURE — 83690 ASSAY OF LIPASE: CPT | Performed by: NURSE PRACTITIONER

## 2017-10-27 PROCEDURE — 85027 COMPLETE CBC AUTOMATED: CPT | Performed by: NURSE PRACTITIONER

## 2017-10-27 PROCEDURE — 36415 COLL VENOUS BLD VENIPUNCTURE: CPT | Performed by: NURSE PRACTITIONER

## 2017-10-27 NOTE — LETTER
[address]  [address]  [phone]    October 30, 2017      Edenilson Mccartney  [address]  [facility] [address]            Mr. Mccartney,    Your abdominal x-ray was negative for reasons for your abdominal pain. There was tiny calcifications (build-up) near your right kidney but it appears that is benign (not serious). This would likely get clarified during your abdominal ultrasound as to where those truly are. Otherwise normal x-ray.    Please contact the clinic if you have additional questions.  Thank you.    Sincerely,    TREY Moore, NP-C

## 2017-10-27 NOTE — MR AVS SNAPSHOT
"              After Visit Summary   10/27/2017    Edenilson Mccartney    MRN: [id number]           Patient Information     Date Of Birth          1985        Visit Information        Provider Department      10/27/2017 2:40 PM Dori Bryant NP [facility]        Today's Diagnoses     Abdominal pain, left upper quadrant    -  1    Morbid obesity with BMI of 45.0-49.9, adult (H)        Type 2 diabetes mellitus with complication, without long-term current use of insulin (H)        Need for prophylactic vaccination and inoculation against influenza          Care Instructions    US: [phone]    Go to ER if anything worsen.           Follow-ups after your visit        Future tests that were ordered for you today     Open Future Orders        Priority Expected Expires Ordered    H Pylori antigen stool Routine  11/26/2017 10/27/2017    US Abdomen Complete Routine  10/27/2018 10/27/2017            Who to contact     If you have questions or need follow up information about today's clinic visit or your schedule please contact [facility] directly at [phone].  Normal or non-critical lab and imaging results will be communicated to you by Citizensidehart, letter or phone within 4 business days after the clinic has received the results. If you do not hear from us within 7 days, please contact the clinic through [employer] or phone. If you have a critical or abnormal lab result, we will notify you by phone as soon as possible.  Submit refill requests through [employer] or call your pharmacy and they will forward the refill request to us. Please allow 3 business days for your refill to be completed.          Additional Information About Your Visit        CitizensideharALGAentis Information     [employer] lets you send messages to your doctor, view your test results, renew your prescriptions, schedule appointments and more. To sign up, go to [email or web address] . Click on \"Log in\" on the left side of the screen, which " "will take you to the Welcome page. Then click on \"Sign up Now\" on the right side of the page.     You will be asked to enter the access code listed below, as well as some personal information. Please follow the directions to create your username and password.     Your access code is: [email or web address]  Expires: 2018  6:44 PM     Your access code will  in 90 days. If you need help or a new code, please call your [facility] clinic or [phone].        Care EveryWhere ID     This is your Care EveryWhere ID. This could be used by other organizations to access your [place] medical records  [id number]        Your Vitals Were     Pulse Temperature Respirations Pulse Oximetry BMI (Body Mass Index)       92 98.7  F (37.1  C) (Oral) 14 98% 46.27 kg/m2        Blood Pressure from Last 3 Encounters:   10/27/17 124/66   10/10/17 (!) 164/101   17 136/84    Weight from Last 3 Encounters:   10/27/17 (!) 159.1 kg (350 lb 11.2 oz)   17 (!) 158.5 kg (349 lb 8 oz)   16 (!) 156.5 kg (345 lb)              We Performed the Following     Basic metabolic panel     CBC with platelets     Hemoglobin A1c     Lipase     XR Abdomen 2 Views        Primary Care Provider Office Phone # Fax #    [facility] [phone] [address] [facility] [address]        Equal Access to Services     NIKO VIRK : Hadii marcy ku hadasho Soomaali, waaxda luqadaha, qaybta kaalmada adeegyada, kavon mariscal . So [facility] [phone].    ATENCIÓN: Si habla español, tiene a lewis disposición servicios gratuitos de asistencia lingüística. Llame al [phone].    We comply with applicable federal civil rights laws and [place] laws. We do not discriminate on the basis of race, color, national origin, age, disability, sex, sexual orientation, or gender identity.            Thank you!     Thank you for choosing [facility]  for your care. Our goal is always to provide " you with excellent care. Hearing back from our patients is one way we can continue to improve our services. Please take a few minutes to complete the written survey that you may receive in the mail after your visit with us. Thank you!             Your Updated Medication List - Protect others around you: Learn how to safely use, store and throw away your medicines at www.disposemymeds.org.          This list is accurate as of: 10/27/17  3:35 PM.  Always use your most recent med list.                   Brand Name Dispense Instructions for use Diagnosis    ASPIRIN NOT PRESCRIBED    INTENTIONAL     Antiplatelet medication not prescribed intentionally due to Not indicated based on age        blood glucose monitoring lancets     1 Box    Use to test blood sugar one time daily or as directed.    Type 2 diabetes, HbA1c goal < 7% (H)       blood glucose monitoring test strip    ACCU-CHEK SMARTVIEW    100 strip    Use to test blood sugar one time daily or as directed.    Type 2 diabetes, HbA1c goal < 7% (H)       losartan 25 MG tablet    COZAAR    90 tablet    Take 1 tablet (25 mg) by mouth daily    HTN, goal below 140/90       metFORMIN 500 MG tablet    GLUCOPHAGE    90 tablet    Take 0.5 tablets (250 mg) by mouth 2 times daily (with meals)    Type 2 diabetes mellitus with complication, without long-term current use of insulin (H)

## 2017-10-27 NOTE — PROGRESS NOTES
SUBJECTIVE:   Edenilson Mccartney is a 32 year old male who presents to clinic today for the following health issues:      ABDOMINAL   PAIN     Onset: Monday    Description:   Character: Sharp  Location: left upper quadrant and  Upper abdomen  Radiation: None    Intensity: moderate    Progression of Symptoms:  worsening    Accompanying Signs & Symptoms:  Fever/Chills?: no   Gas/Bloating: YES- bloating  Nausea: no   Vomitting: no   Diarrhea?: YES  Constipation:YES- possibly and took a laxative  Dysuria or Hematuria: no    History:   Trauma: no   Previous similar pain: no    Previous tests done: none    Precipitating factors:   Does the pain change with:     Food: YES     BM: no     Urination: no     Alleviating factors:  Sitting in a recliner    Therapies Tried and outcome: rolaids, tums, john laxative but none of that is working    LMP:  not applicable     Monday had BM, soon after had that he started having some pain. Will go away for some time, barely noticeable. Then it will come back very painful.  It started in the mid-epigastric area then moved to the left. Now just in the left upper quadrant.  Gets a little worse after eating. Laying flat makes the pain worse. LBM today-small. Having daily small amounts. No issues urinating. +bloating/gas. Changed diet recently: eating more fruits and vegetables. Hunching over makes it better.  Denies pain into left shoulder or chest. Denies trauma to abdomen or chest.    Diabetes: last A1C 6.3 on 2/8/17. Diet, exercise not going well. No issues taking his metformin. Just started making dietary changes.  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 He is obese.         Problem list and histories reviewed & adjusted, as indicated.  Additional history: as documented    Patient Active Problem List   Diagnosis     Proteinuria     Hyperlipidemia LDL goal <100     Morbid obesity with BMI of 45.0-49.9, adult (H)     HTN, goal below 140/90      Microalbuminuria     Type 2 diabetes mellitus with complication, without long-term current use of insulin (H)     History reviewed. No pertinent surgical history.    Social History   Substance Use Topics     Smoking status: Never Smoker     Smokeless tobacco: Never Used     Alcohol use 0.0 oz/week      Comment: occasional     History reviewed. No pertinent family history.      Current Outpatient Prescriptions   Medication Sig Dispense Refill     losartan (COZAAR) 25 MG tablet Take 1 tablet (25 mg) by mouth daily 90 tablet 1     metFORMIN (GLUCOPHAGE) 500 MG tablet Take 0.5 tablets (250 mg) by mouth 2 times daily (with meals) 90 tablet 1     ASPIRIN NOT PRESCRIBED, INTENTIONAL, Antiplatelet medication not prescribed intentionally due to Not indicated based on age       blood glucose monitoring (ACCU-CHEK FASTCLIX) lancets Use to test blood sugar one time daily or as directed. 1 Box prn     blood glucose (ACCU-CHEK SMARTVIEW) test strip Use to test blood sugar one time daily or as directed. 100 strip prn     Allergies   Allergen Reactions     No Known Drug Allergies          Reviewed and updated as needed this visit by clinical staffTobacco  Allergies  Meds  Problems  Med Hx  Surg Hx  Fam Hx  Soc Hx        Reviewed and updated as needed this visit by Provider  Allergies  Meds  Problems         ROS:  Constitutional, cardiovascular, pulmonary, gi and gu systems are positive as noted in HPI.      OBJECTIVE:   /66 (BP Location: Right arm, Patient Position: Sitting, Cuff Size: Adult Large)  Pulse 92  Temp 98.7  F (37.1  C) (Oral)  Resp 14  Wt (!) 159.1 kg (350 lb 11.2 oz)  SpO2 98%  BMI 46.27 kg/m2  Body mass index is 46.27 kg/(m^2).  GENERAL: healthy but tired appearing, alert and no distress  RESP: lungs clear to auscultation - no rales, rhonchi or wheezes. No SOB  CV: regular rate and rhythm, normal S1 S2, no S3 or S4, no murmur, click or rub. No chest pain  ABDOMEN: soft, obese, tender in mid  epigastric to left upper quadrant to left middle quadrant. no hepatosplenomegaly-DIANE due to body habitus, no masses and bowel sounds normal  MS: no gross musculoskeletal defects noted, no edema  Neuro: denies neuropathy    Diagnostic Test Results:  No results found for this or any previous visit (from the past 24 hour(s)).    ASSESSMENT/PLAN:       1. Abdominal pain, left upper quadrant  Concerned about the following but not limited to: spleen, pancrease, constipation, reflux. Check labs, CBC/BMP normal. Awaiting US results next week. Lipase normal. X-ray normal.  - XR Abdomen 2 Views  - CBC with platelets  - Lipase  - H Pylori antigen stool; Future  - US Abdomen Complete; Future    2. Morbid obesity with BMI of 45.0-49.9, adult (H)  Needs to work on diet and exercise. 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 Needs to get his HR elevated.     3. Type 2 diabetes mellitus with complication, without long-term current use of insulin (H)  Check A1C today. Continue working on diet changes.   - Basic metabolic panel  - Hemoglobin A1c    4. Need for prophylactic vaccination and inoculation against influenza  Given       FUTURE APPOINTMENTS:       - Follow-up visit in 7-10 days if not improving    TREY Moore, NP-C  [facility]

## 2017-10-27 NOTE — PROGRESS NOTES

## 2017-10-27 NOTE — NURSING NOTE
"Chief Complaint   Patient presents with     Abdominal Pain       Initial /66 (BP Location: Right arm, Patient Position: Sitting, Cuff Size: Adult Large)  Pulse 92  Temp 98.7  F (37.1  C) (Oral)  Resp 14  Wt (!) 159.1 kg (350 lb 11.2 oz)  SpO2 98%  BMI 46.27 kg/m2 Estimated body mass index is 46.27 kg/(m^2) as calculated from the following:    Height as of 2/8/17: 1.854 m (6' 1\").    Weight as of this encounter: 159.1 kg (350 lb 11.2 oz).  Medication Reconciliation: pedro Dai        "

## 2017-10-30 ENCOUNTER — TELEPHONE (OUTPATIENT)
Dept: FAMILY MEDICINE | Facility: CLINIC | Age: 32
End: 2017-10-30

## 2017-10-30 NOTE — TELEPHONE ENCOUNTER
Patient returned call    Best number to reach them: Home number on file [phone] (home)    Is it ok to leave a detailed message: YES

## 2017-10-30 NOTE — TELEPHONE ENCOUNTER
This writer attempted to contact Edenilson on 10/30/17      Reason for call lab results and left message to return call.      If patient calls back:   Patient contacted by [facility] Team (MA/TC). Inform patient that someone from the team will contact them, document that pt called and route to care team. .        Shalini Gupta

## 2017-10-30 NOTE — TELEPHONE ENCOUNTER
Left detailed message informing patient - asked him to call back if he had any other questions or concerns.    Florencio London MA

## 2017-11-02 ENCOUNTER — TELEPHONE (OUTPATIENT)
Dept: FAMILY MEDICINE | Facility: CLINIC | Age: 32
End: 2017-11-02

## 2017-11-02 ENCOUNTER — RADIANT APPOINTMENT (OUTPATIENT)
Dept: ULTRASOUND IMAGING | Facility: CLINIC | Age: 32
End: 2017-11-02
Attending: NURSE PRACTITIONER
Payer: COMMERCIAL

## 2017-11-02 DIAGNOSIS — R10.12 ABDOMINAL PAIN, LEFT UPPER QUADRANT: ICD-10-CM

## 2017-11-02 LAB — RADIOLOGIST FLAGS: ABNORMAL

## 2017-11-02 PROCEDURE — 76700 US EXAM ABDOM COMPLETE: CPT | Performed by: RADIOLOGY

## 2017-11-02 NOTE — TELEPHONE ENCOUNTER
On Call Note:  Ultrasound results preliminary called. Subcapsular hematoma in spleen that is fairly large. Pain is improved and pretty much gone per patient over past few days. Started after bowel movement and up to 7-8/10 intermittently when it started. Lasted 5 days. No trauma to abdomen. He did have a upper respiratory infection with sore throat 2 weeks before this happened.   P. Will avoid any contact sports or activities for 2-3 months. Call for follow up appointment with CBC and possible tests for coagulation functions. Mono and CMV?  Review ultrasound with surgeon to see if any other management is needed. Patient will call if any increased pain in the mean time.  Deirdre Woodard MD

## 2017-11-03 ENCOUNTER — TELEPHONE (OUTPATIENT)
Dept: FAMILY MEDICINE | Facility: CLINIC | Age: 32
End: 2017-11-03

## 2017-11-03 NOTE — TELEPHONE ENCOUNTER
This writer attempted to contact Edenilson on 11/03/17      Reason for call following up on ultrasound results that on-call MD spoke to him about 1 day ago and how he is feeling today and left message to return call and let staff know how he is feeling. Would like him to return to clinic in 1-2 weeks for labs and to see if abdominal pain is improved. To call or return sooner if sudden abdominal pain.      If patient calls back:   Schedule Office Visit appointment within 1 week with PCP, document that pt called and send to NP.     NP also spoke to her consulting MD Dr. Garsia, do not feel need to talk to a surgeon at this time about spleen hematoma-patient is stable, pain improved and will abstain from contact sports and physical activity for 2-3 months per Dr. Woodard's phone conversation with patient 1 day ago. May need follow up ultrasound in a few weeks to ensure not worsening.      TREY Moore, NP-C

## 2017-11-06 NOTE — TELEPHONE ENCOUNTER
This writer attempted to contact Edenilson on 11/06/17      Reason for call obtain additional information and left message that clinic will return call.      If patient calls back:   Patient contacted by clinic RN team. Inform patient that someone from the team will contact them, document that pt called and route to care team. .        Caity Walters, TOMMY

## 2017-11-07 NOTE — TELEPHONE ENCOUNTER
While there are not specific weight limit restrictions with those of splenic injury, it would be recommended he should not be lifting > 25-30 lb for 3-4 weeks as a precaution (100 lb is too heavy), as heavy lifting increases the intraabdominal pressure which could potentially affect his spleen.  After 4 weeks he can gradually increase his lifting at work.  If he needs a work letter for restrictions let provider know.   TREY Moore, NP-C

## 2017-11-07 NOTE — TELEPHONE ENCOUNTER
Patient returned call     Best number to reach them: Cell number on file:    Telephone Information:   Mobile [phone]       Is it ok to leave a detailed message: YES

## 2017-11-07 NOTE — TELEPHONE ENCOUNTER
Called patient and advised of below. At this time he doesn't think he need a work note but he will let us know if he does.  Allison Moseley RN.

## 2017-11-07 NOTE — TELEPHONE ENCOUNTER
This writer attempted to contact Edenilson on 11/07/17      Reason for call update for provider and left message to return call.      If patient calls back:   Patient contacted by clinic RN team. Inform patient that someone from the team will contact them, document that pt called and route to care team. .        Caity Walters, RN

## 2017-11-07 NOTE — TELEPHONE ENCOUNTER
Patient states that his pain is getting better. Not taking any pain meds. Scheduled a follow up appt with you 11/14. 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 Advised no contact sports if he plays on any leagues abd avid being hit in stomach especially left side.   Routing to provider to review and advise. - work isn't a problem, correct?  Allison Moseley RN.

## 2017-11-14 ENCOUNTER — OFFICE VISIT (OUTPATIENT)
Dept: FAMILY MEDICINE | Facility: CLINIC | Age: 32
End: 2017-11-14
Payer: COMMERCIAL

## 2017-11-14 VITALS
WEIGHT: 315 LBS | RESPIRATION RATE: 12 BRPM | HEART RATE: 90 BPM | OXYGEN SATURATION: 98 % | TEMPERATURE: 98.8 F | SYSTOLIC BLOOD PRESSURE: 134 MMHG | DIASTOLIC BLOOD PRESSURE: 86 MMHG | BODY MASS INDEX: 46.57 KG/M2

## 2017-11-14 DIAGNOSIS — R16.1 SPLENOMEGALY: ICD-10-CM

## 2017-11-14 DIAGNOSIS — I10 HTN, GOAL BELOW 140/90: ICD-10-CM

## 2017-11-14 DIAGNOSIS — E11.8 TYPE 2 DIABETES MELLITUS WITH COMPLICATION, WITHOUT LONG-TERM CURRENT USE OF INSULIN (H): Primary | ICD-10-CM

## 2017-11-14 LAB — HGB BLD-MCNC: 13.9 G/DL (ref 13.3–17.7)

## 2017-11-14 PROCEDURE — 36415 COLL VENOUS BLD VENIPUNCTURE: CPT | Performed by: NURSE PRACTITIONER

## 2017-11-14 PROCEDURE — 99214 OFFICE O/P EST MOD 30 MIN: CPT | Performed by: NURSE PRACTITIONER

## 2017-11-14 PROCEDURE — 85018 HEMOGLOBIN: CPT | Performed by: NURSE PRACTITIONER

## 2017-11-14 RX ORDER — LOSARTAN POTASSIUM 25 MG/1
25 TABLET ORAL DAILY
Qty: 90 TABLET | Refills: 1 | Status: SHIPPED | OUTPATIENT
Start: 2017-11-14 | End: 2018-01-23

## 2017-11-14 NOTE — MR AVS SNAPSHOT
"              After Visit Summary   11/14/2017    Edenilson Mccartney    MRN: [id number]           Patient Information     Date Of Birth          1985        Visit Information        Provider Department      11/14/2017 5:00 PM Dori Bryant NP [facility]        Today's Diagnoses     Type 2 diabetes mellitus with complication, without long-term current use of insulin (H)    -  1    HTN, goal below 140/90        Splenomegaly           Follow-ups after your visit        Future tests that were ordered for you today     Open Future Orders        Priority Expected Expires Ordered    US Abdomen Limited Routine  11/14/2018 11/14/2017            Who to contact     If you have questions or need follow up information about today's clinic visit or your schedule please contact [facility] directly at [phone].  Normal or non-critical lab and imaging results will be communicated to you by MyChart, letter or phone within 4 business days after the clinic has received the results. If you do not hear from us within 7 days, please contact the clinic through [employer] or phone. If you have a critical or abnormal lab result, we will notify you by phone as soon as possible.  Submit refill requests through [employer] or call your pharmacy and they will forward the refill request to us. Please allow 3 business days for your refill to be completed.          Additional Information About Your Visit        [employer] Information     [employer] lets you send messages to your doctor, view your test results, renew your prescriptions, schedule appointments and more. To sign up, go to [email or web address] . Click on \"Log in\" on the left side of the screen, which will take you to the Welcome page. Then click on \"Sign up Now\" on the right side of the page.     You will be asked to enter the access code listed below, as well as some personal information. Please follow the directions to create your username and password.   "   Your access code is: [email or web address]  Expires: 2018  5:44 PM     Your access code will  in 90 days. If you need help or a new code, please call your [facility] clinic or [phone].        Care EveryWhere ID     This is your Care EveryWhere ID. This could be used by other organizations to access your [facility] medical records  [id number]        Your Vitals Were     Pulse Temperature Respirations Pulse Oximetry BMI (Body Mass Index)       90 98.8  F (37.1  C) (Oral) 12 98% 46.57 kg/m2        Blood Pressure from Last 3 Encounters:   17 134/86   10/27/17 124/66   10/10/17 (!) 164/101    Weight from Last 3 Encounters:   17 (!) 160.1 kg (353 lb)   10/27/17 (!) 159.1 kg (350 lb 11.2 oz)   17 (!) 158.5 kg (349 lb 8 oz)                 Where to get your medicines      These medications were sent to [facility] [place] [address] [facility] [address] [facility] [address]     Phone:  [phone]     losartan 25 MG tablet    metFORMIN 500 MG tablet          Primary Care Provider Office Phone # Fax #    [facility] [phone] [address] [facility] [address]        Equal Access to Services     NIKO VIRK AH: Seth valenzuelao Soadis, waaxda luqadaha, qaybta kaalkavon august. So [facility] [place] [phone].    ATENCIÓN: Si habla español, tiene a lewis disposición servicios gratuitos de asistencia lingüística. Paul al [phone].    We comply with applicable federal civil rights laws and [place] laws. We do not discriminate on the basis of race, color, national origin, age, disability, sex, sexual orientation, or gender identity.            Thank you!     Thank you for choosing [facility]  for your care. Our goal is always to provide you with excellent care. Hearing back from our patients is one way we can continue to improve our services. Please take a few minutes to complete the written  survey that you may receive in the mail after your visit with us. Thank you!             Your Updated Medication List - Protect others around you: Learn how to safely use, store and throw away your medicines at www.disposemymeds.org.          This list is accurate as of: 11/14/17  5:29 PM.  Always use your most recent med list.                   Brand Name Dispense Instructions for use Diagnosis    ASPIRIN NOT PRESCRIBED    INTENTIONAL     Antiplatelet medication not prescribed intentionally due to Not indicated based on age        blood glucose monitoring lancets     1 Box    Use to test blood sugar one time daily or as directed.    Type 2 diabetes, HbA1c goal < 7% (H)       blood glucose monitoring test strip    ACCU-CHEK SMARTVIEW    100 strip    Use to test blood sugar one time daily or as directed.    Type 2 diabetes, HbA1c goal < 7% (H)       losartan 25 MG tablet    COZAAR    90 tablet    Take 1 tablet (25 mg) by mouth daily    HTN, goal below 140/90       metFORMIN 500 MG tablet    GLUCOPHAGE    90 tablet    Take 0.5 tablets (250 mg) by mouth 2 times daily (with meals)    Type 2 diabetes mellitus with complication, without long-term current use of insulin (H)

## 2017-11-14 NOTE — LETTER
November 15, 2017      Edenilson Mccartney  [address] [facility] [address]              Mr. Mccartney,     Your hemoglobin is normal at 13.9.     Please contact the clinic if you have additional questions.  Thank you.     Sincerely,     TREY Moore, NP-C

## 2017-11-14 NOTE — LETTER
November 14, 2017      Edenilson Mccartney  [address] [facility] [address]        To Whom It May Concern:    Edenilson Mccartney was seen in our clinic. He may work but has weight lifting restrictions of 25-30 lbs for 2-3 more weeks. After that he may slowly increase lifting more weight at work.       Sincerely,        TREY Moore, NP-C

## 2017-11-14 NOTE — PROGRESS NOTES
SUBJECTIVE:   Edenilson Mccartney is a 32 year old male who presents to clinic today for the following health issues:      Diabetes Follow-up      Patient is checking blood sugars: rarely.  Results range from 85 to 90-ranging from fasting to not fasting.     Diabetic concerns: None     Symptoms of hypoglycemia (low blood sugar): none     Paresthesias (numbness or burning in feet) or sores: No     Date of last diabetic eye exam: less than a year ago  No numbness/tingling.      Hypertension Follow-up      Outpatient blood pressures are not being checked.    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      Abdominal pain: much improved. Moving bowels, no N/V. No fever/chills. Does construction at work. Has not been lifting much.         Problem list and histories reviewed & adjusted, as indicated.  Additional history: as documented    Patient Active Problem List   Diagnosis     Proteinuria     Hyperlipidemia LDL goal <100     Morbid obesity with BMI of 45.0-49.9, adult (H)     HTN, goal below 140/90     Microalbuminuria     Type 2 diabetes mellitus with complication, without long-term current use of insulin (H)     History reviewed. No pertinent surgical history.    Social History   Substance Use Topics     Smoking status: Never Smoker     Smokeless tobacco: Never Used     Alcohol use 0.0 oz/week      Comment: occasional     History reviewed. No pertinent family history.      Current Outpatient Prescriptions   Medication Sig Dispense Refill     metFORMIN (GLUCOPHAGE) 500 MG tablet Take 0.5 tablets (250 mg) by mouth 2 times daily (with meals) 90 tablet 1     losartan (COZAAR) 25 MG tablet Take 1 tablet (25 mg) by mouth daily 90 tablet 1     ASPIRIN NOT PRESCRIBED, INTENTIONAL, Antiplatelet medication not prescribed  intentionally due to Not indicated based on age       blood glucose monitoring (ACCU-CHEK FASTCLIX) lancets Use to test blood sugar one time daily or as directed. 1 Box prn     blood glucose (ACCU-CHEK SMARTVIEW) test strip Use to test blood sugar one time daily or as directed. 100 strip prn     [DISCONTINUED] losartan (COZAAR) 25 MG tablet Take 1 tablet (25 mg) by mouth daily 90 tablet 1     [DISCONTINUED] metFORMIN (GLUCOPHAGE) 500 MG tablet Take 0.5 tablets (250 mg) by mouth 2 times daily (with meals) 90 tablet 1     Allergies   Allergen Reactions     No Known Drug Allergies          Reviewed and updated as needed this visit by clinical staffTobacco  Allergies  Meds  Problems  Med Hx  Surg Hx  Fam Hx  Soc Hx        Reviewed and updated as needed this visit by Provider  Allergies  Meds  Problems         ROS:  Constitutional, HEENT, cardiovascular, pulmonary, gi and gu systems are negative, except as otherwise noted.      OBJECTIVE:   /86 (BP Location: Right arm, Patient Position: Sitting, Cuff Size: Adult Large)  Pulse 90  Temp 98.8  F (37.1  C) (Oral)  Resp 12  Wt (!) 160.1 kg (353 lb)  SpO2 98%  BMI 46.57 kg/m2  Body mass index is 46.57 kg/(m^2).  GENERAL: healthy, alert and no distress  RESP: lungs clear to auscultation - no rales, rhonchi or wheezes  CV: regular rate and rhythm, normal S1 S2, no S3 or S4, no murmur, click or rub  ABDOMEN: soft, obese, nontender, no hepatosplenomegaly, no masses and bowel sounds normal  MS: no gross musculoskeletal defects noted, no edema    Diagnostic Test Results:  Results for orders placed or performed in visit on 11/14/17 (from the past 24 hour(s))   Hemoglobin   Result Value Ref Range    Hemoglobin 13.9 13.3 - 17.7 g/dL       ASSESSMENT/PLAN:       1. Type 2 diabetes mellitus with complication, without long-term current use of insulin (H)  Refilled. Increase exercise, work on diet  - metFORMIN (GLUCOPHAGE) 500 MG tablet; Take 0.5 tablets (250 mg) by  mouth 2 times daily (with meals)  Dispense: 90 tablet; Refill: 1    2. HTN, goal below 140/90  Refilled.   - losartan (COZAAR) 25 MG tablet; Take 1 tablet (25 mg) by mouth daily  Dispense: 90 tablet; Refill: 1    3. Splenomegaly  Hemoglobin normal. Check US again to ensure spleen stable.  Continue weight restrictions, avoid activity that may trauma abdomen  - US Abdomen Limited; Future  - Hemoglobin    FUTURE APPOINTMENTS:       - Follow-up visit prn not improving    TREY Moore, NP-C  [facility]

## 2017-11-14 NOTE — NURSING NOTE
"Chief Complaint   Patient presents with     RECHECK       Initial /86 (BP Location: Right arm, Patient Position: Sitting, Cuff Size: Adult Large)  Pulse 90  Temp 98.8  F (37.1  C) (Oral)  Resp 12  Wt (!) 160.1 kg (353 lb)  SpO2 98%  BMI 46.57 kg/m2 Estimated body mass index is 46.57 kg/(m^2) as calculated from the following:    Height as of 2/8/17: 1.854 m (6' 1\").    Weight as of this encounter: 160.1 kg (353 lb).  Medication Reconciliation: pedro Dai        "

## 2017-11-21 ENCOUNTER — TELEPHONE (OUTPATIENT)
Dept: FAMILY MEDICINE | Facility: CLINIC | Age: 32
End: 2017-11-21

## 2017-11-21 ENCOUNTER — RADIANT APPOINTMENT (OUTPATIENT)
Dept: ULTRASOUND IMAGING | Facility: CLINIC | Age: 32
End: 2017-11-21
Attending: NURSE PRACTITIONER
Payer: COMMERCIAL

## 2017-11-21 DIAGNOSIS — R16.1 SPLENOMEGALY: ICD-10-CM

## 2017-11-21 DIAGNOSIS — R16.1 SPLENOMEGALY: Primary | ICD-10-CM

## 2017-11-21 PROCEDURE — 76705 ECHO EXAM OF ABDOMEN: CPT | Performed by: RADIOLOGY

## 2017-11-21 NOTE — TELEPHONE ENCOUNTER
Call patient - Dori, obviously, is out for a little while.  I don't have the official ultrasound results to look at.  Probably need to await these to know what is going on. If the hematoma that was there previously is bigger he needs to be seen by surgery. The fastest way might even be a trip to the ER.  Hopefully we'll get results later today

## 2017-11-21 NOTE — TELEPHONE ENCOUNTER
What would the ER do in this case? Would they remove it? Pt wants to know what should he do exactly    Vivian Dai MA

## 2017-11-21 NOTE — TELEPHONE ENCOUNTER
I just saw the official radiology reading. They did not think that the area of bleeding was bigger, however it looked a little bit different. The radiologist is not certain what this is or why it happened. According to Dori's notes we don't seem to have a reason as to why it happened either (such as a trauma) - so the radiologist suggested, and I agree, that ultrasound does not tell us what we need to know. We need to get a CT scan of this and make sure there isn't some sort of underlying growth or tumor. Depending upon the results this will either take time to resolve, or might need some type of surgical procedure. I don't know until we know what is going on. We may even enlist the help of a specialist to decide. But the first step is the CT scan. I ordered that and he can schedule it at [place]

## 2017-11-21 NOTE — TELEPHONE ENCOUNTER
Reason for Call:  Request for results:    Name of test or procedure: Ultrasound    Date of test of procedure: 11/21/2017    Location of the test or procedure: [facility]    OK to leave the result message on voice mail or with a family member? YES    Phone number Patient can be reached at:  Home number on file [phone] (home)    Additional comments: Patient was told by tech that did the ultrasound that his spleen is bleeding, and wants to discuss as soon as possible.    Call taken on 11/21/2017 at 2:17 PM by Chelsi Granados

## 2017-11-22 ENCOUNTER — NURSE TRIAGE (OUTPATIENT)
Dept: NURSING | Facility: CLINIC | Age: 32
End: 2017-11-22

## 2017-11-22 ENCOUNTER — TELEPHONE (OUTPATIENT)
Dept: FAMILY MEDICINE | Facility: CLINIC | Age: 32
End: 2017-11-22

## 2017-11-22 NOTE — TELEPHONE ENCOUNTER
Edenilson scheduled a CT for Friday, November 24th.  Edenilson is calling today to see if this is soon enough  Or does he need this before Friday.  Please phone Edenilson at [phone] and if no answer it is alright  To leave a detailed message.

## 2017-11-22 NOTE — TELEPHONE ENCOUNTER
Called patient and informed of the provider statement as written.    Caity Walters RN, [facility]

## 2017-11-22 NOTE — TELEPHONE ENCOUNTER
Clinic Action Needed:  Yes, callback  [facility] Triage Call  Presenting Problem:  Edenilson scheduled a CT for Friday, November 24th.  Edenilson is calling today to see if this is soon enough  Or does he need this before Friday.  Please phone Edenilson at [phone] and if no answer it is alright  To leave a detailed message.      Routed to:  RN Pool  Please be sure to close this encounter once this patient's issue/question has been addressed.    Catherine Vigil RN/[facility]

## 2017-11-24 ENCOUNTER — RADIANT APPOINTMENT (OUTPATIENT)
Dept: CT IMAGING | Facility: CLINIC | Age: 32
End: 2017-11-24
Attending: FAMILY MEDICINE
Payer: COMMERCIAL

## 2017-11-24 DIAGNOSIS — R16.1 SPLENOMEGALY: ICD-10-CM

## 2017-11-24 PROCEDURE — 74177 CT ABD & PELVIS W/CONTRAST: CPT | Performed by: RADIOLOGY

## 2017-11-24 RX ORDER — IOPAMIDOL 755 MG/ML
135 INJECTION, SOLUTION INTRAVASCULAR ONCE
Status: COMPLETED | OUTPATIENT
Start: 2017-11-24 | End: 2017-11-24

## 2017-11-24 RX ADMIN — IOPAMIDOL 135 ML: 755 INJECTION, SOLUTION INTRAVASCULAR at 15:59

## 2017-11-25 DIAGNOSIS — S36.029A SPLEEN HEMATOMA, INITIAL ENCOUNTER: Primary | ICD-10-CM

## 2017-11-27 ENCOUNTER — TELEPHONE (OUTPATIENT)
Dept: FAMILY MEDICINE | Facility: CLINIC | Age: 32
End: 2017-11-27

## 2017-11-27 NOTE — TELEPHONE ENCOUNTER
Notes Recorded by Lyssa Garsia MD on 11/25/2017 at 10:45 PM  Call patient (and send a copy of labs):  CT scan showed nothing bad - just the bleeding around the spleen that we had known about.  Not necessarily bigger.  Not really sure what to do with this (rain since Dori had started the workup.)  Might be best to have him meet with a surgeon for advice - not that he needs surgery, but they are the experts of abdominal stuff like this.  I'd like to set him up with Dr Solis at [facility].  I'll order the consult ASAP - help him set this up.  S. Negro Garsia M.D.        This writer attempted to contact Edenilson on 11/27/17      Reason for call reults and left message that clinic will return call.      If patient calls back:   Patient contacted by clinic RN team. Inform patient that someone from the team will contact them, document that pt called and route to care team. .        Asha Moran RN

## 2017-11-27 NOTE — TELEPHONE ENCOUNTER
Called and spoke with patient. Discussed results below per provider. Patient verbalizes understanding. Patient given specialty scheduling phone # to set up appointment with Dr. Solis and general surgery. Patient had a question regarding work status.   1) Patient is wondering if should currently be working with this condition or if should be taking some time off to let his spleen heal. 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 Has been working lighter duty lately but employer wondering if needs to find someone else for jobs.    Provider please advise.    Ok to leave detailed message on patient voicemail regarding this.  Asha Moran RN

## 2017-11-27 NOTE — TELEPHONE ENCOUNTER
Reason for Call:  Other Results    Detailed comments: Pt returning phone call and would like a phone call back to discuss results    Phone Number Patient can be reached at: Home number on file [phone] (home)    Best Time: anytime    Can we leave a detailed message on this number? YES    Call taken on 11/27/2017 at 11:01 AM by Ba Gonzalez

## 2017-11-27 NOTE — TELEPHONE ENCOUNTER
Probably best not to have him do heavy lifting until we know more about what is going on.  But, if a letter or form is needed, I'm going to ask that the patient make an appointment with me. I've never met him and it wouldn't be ethical to continue treating this without knowing the patient.

## 2017-11-27 NOTE — TELEPHONE ENCOUNTER
Called and left patient detailed voicemail message. Informed of below per Dr. Garsia regarding not doing any heavy lifting till more is known with condition. Advised patient to contact [facility] to schedule appointment if any type of form or paperwork is needed regarding work restrictions. [facility] phone # provided.  Asha Moran RN

## 2017-11-28 ENCOUNTER — OFFICE VISIT (OUTPATIENT)
Dept: SURGERY | Facility: CLINIC | Age: 32
End: 2017-11-28
Payer: COMMERCIAL

## 2017-11-28 VITALS
DIASTOLIC BLOOD PRESSURE: 86 MMHG | SYSTOLIC BLOOD PRESSURE: 134 MMHG | BODY MASS INDEX: 40.43 KG/M2 | WEIGHT: 315 LBS | HEART RATE: 90 BPM | HEIGHT: 74 IN

## 2017-11-28 DIAGNOSIS — I83.90 VARICOSE VEIN OF LEG: Primary | ICD-10-CM

## 2017-11-28 DIAGNOSIS — S36.029D SPLEEN HEMATOMA, SUBSEQUENT ENCOUNTER: ICD-10-CM

## 2017-11-28 PROCEDURE — 99203 OFFICE O/P NEW LOW 30 MIN: CPT | Performed by: SURGERY

## 2017-11-28 NOTE — PROGRESS NOTES
Dear Dr. Mccain, [facility]  I was asked to see this patient by [facility] for please see below.  I have seen Edenilson Mccartney and as you know his chief complaint is the splenic hematoma  Late October on a Monday everything was going well and then had a bowel movement and then had more left than right of the epigastric area pain.    Denies nausea, vomitting, diahrrea, constipation   No night sweats  No weight loss  No family history of cancer.   Pain was only for the first week.    And is having no pain now.   No chills or fever  Before this happened had good energy level and for just that week had a lower energy level.   Did have a sore throat in ealry October.  Son had strep throat and so he was tested for it and was negative.  Was better in 5 days.     EXAMINATION: US ABDOMEN COMPLETE, 11/2/2017 5:14 PM      COMPARISON: None.     HISTORY: Left upper quadrant pain, which started spontaneously while  in the bathroom on 10/23/2017. The patient reports he felt worse on  10/27/2017 but has improved symptomatically, and has no pain since  10/30/2017. 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 He denies  known trauma. He reports a recent viral cold which lasted for about a  week.     TECHNIQUE: The abdomen was scanned in standard fashion with  specialized ultrasound transducer(s) using both grey scale and limited  color Doppler techniques.     Findings:  Liver: The liver is diffusely hyperechoic without focal mass. It  measures 18.3 cm in length. The main portal vein is patent with  antegrade flow, measuring 1.1 cm.  Gallbladder: The gallbladder is well distended and of normal  morphology. There is no wall thickening, pericholecystic fluid,  sonographic Levin's sign nor evidence for cholelithiasis.  Bile Ducts: Both the intra- and extrahepatic biliary system are of  normal caliber.  The common bile duct measures 3 mm in diameter.  Pancreas: Visualized portions of the head and body of the  pancreas are  unremarkable.   Kidneys: Both kidneys are of normal echotexture, without mass nor  hydronephrosis.   The craniocaudal dimensions are: right- 12.6 cm,  left- 12.8 cm.     Spleen: The spleen measures 15.5 cm in length. Anterior to the spleen,  there is a complex collection measuring 17.6 x 3.2 x 13.8 cm. The  complexity within this collection suggests organizing subcapsular  hematoma.  There appears to be blood flow in a septation.     There is no free fluid in the abdominal quadrants.     Aorta and IVC:  The visualized portions of the aorta and IVC are  unremarkable.   Fluid: No evidence of ascites or pleural effusions.         Impression:   1. Large, organized subcapsular splenic hematoma. The patient reports  no current pain and appears stable. Options for further evaluation  with CT scan in the ED versus following up with his provider tomorrow  was given. Due to his stability and no symptoms, he chose to return  home. These findings were discussed with the physician on call Dr. Woodard by telephone on 11/2/2017 at 1750 hours. She will communicate  with the patient by telephone later this evening. The patient was also  advised to present to the ED if he has returning pain.     2. Hyperechoic liver, commonly seen with fatty liver.     [Access Center: Splenic hematoma.]    EXAMINATION: US ABDOMEN LIMITED, 11/21/2017 7:17 AM      COMPARISON: 11/2/2017     HISTORY: Follow-up splenic hematoma. The patient reports no pain nor  symptoms.     FINDINGS: The spleen measures 16.1 x 17.3 x 7.1 cm in size. Previously  it measured 15.5 cm in sagittal dimension.     Anterior the spleen, there is an anechoic collection measuring 13.7 x  15.7 x 4.4 cm. Previously, this measured 17.6 x 3.2 x 13.8 cm.     In between the normal splenic tissue and this anechoic collection,  there is a complex heterogeneous defect which measures 12.3 x 10.7 x  2.1 cm. This defect was previously measured as part of the presumed  organizing  hematoma on prior ultrasound study.     There is no increased internal blood flow within these collections.  The vascular pedicle appears intact.         IMPRESSION:  1.  There is a complex cystic mass at the peripheral aspect of the  spleen. Due to its large size, it is difficult to compare measurements  by ultrasound. By visual inspection, it appears relatively stable. The  splenic surface has become more complex and irregular. Splenic  hematomas without history of trauma is uncommon. In this setting,  differential for cystic splenic masses should be considered. Benign  processes such as hypercoagulable states or infection or neoplasm such  as lymphoma or metastasis should be considered. Further evaluation  with contrast-enhanced CT is recommended. If the patient has recurrent  pain, then CT angiogram is recommended.     JASON WISE MD    Study Result   EXAMINATION: CT ABDOMEN PELVIS W CONTRAST, 11/24/2017 4:00 PM     TECHNIQUE:  Helical CT images from the lung bases through the  symphysis pubis were obtained with IV contrast. Contrast dose: ISOVUE  370 135 ML      COMPARISON: Ultrasound abdomen 11/21/2017     HISTORY: Follow-up ultrasounds on spleen showing complex possible  mass; Splenomegaly     FINDINGS:     Abdomen and pelvis:   Liver: Focal steatosis adjacent to the fossa for ligamentum teres.  Otherwise unremarkable.  Gallbladder/biliary: No intrahepatic or extra hepatic biliary  dilation. No calcified gallstones, gallbladder wall thickening, or  pericholecystic fluid.  Pancreas: Unremarkable  Spleen: Spleen is enlarged, measuring 17 cm clinical dimension. Along  the lateral spleen there is a 15.2 cm x 8.4 cm x 15.8 cm nonenhancing  well circumscribed hypoattenuating fluid collection. No worrisome  enhancement or nodularity. There is however apparent increased  internal attenuation at the medial aspect of the collection which  corresponds to irregularity seen on comparison ultrasound. Somewhat  linear area  of nonenhancement/hypoenhancement in the spleen (series 2,  images 42 and 43), potentially a laceration. Small splenule anterior  to the spleen  Adrenals: Unremarkable  Kidneys: No hydronephrosis or renal mass. Bladder is decompressed and  unremarkable. Nonobstructing right renal stone measures 3 mm.  Reproductive organs: Unremarkable  Bowel: The large and small bowel are normal caliber. Appendix is  visualized and unremarkable.  Vasculature Abdominal aorta is normal caliber, and major branches are  patent. Portal vein, splenic vein, and SMV are patent.  Subcentimeter lymph nodes in the hepatic ligament. No retroperitoneal  lymphadenopathy. Prominent inguinal lymph nodes, which are not  technically enlarged by CT size criteria, measuring up to 1.2 cm.  No intraperitoneal free air or free fluid.     Lung bases: Clear     Bones and soft tissues: No suspicious bony lesions.         IMPRESSION:   Mildly complex subcapsular fluid collection along the lateral aspect  of the spleen is favored to represent evolving subcapsular splenic  hematoma; no worrisome enhancement or nodularity.  While findings,  especially on comparison ultrasound, are somewhat atypical for  hematoma (including lack of preceding trauma) consider correlation  with a EBV titers (especially in light of splenomegaly). Recommend  obtaining a follow-up ultrasound in 6 weeks to assess for  evolution/regression.      I have personally reviewed the examination and initial interpretation  and I agree with the findings.     EDGAR DONAHUE MD          HPI:  Patient is a 32 year old male  with complaints see above  The patient noticed the symptoms about 1 month ago.    Patient has not family history of spleen problems  Taking it easy makes the episode better.      Review Of Systems  Respiratory: No shortness of breath, dyspnea on exertion, cough, or hemoptysis  Cardiovascular: negative  Gastrointestinal: negative  Endocrine: diabetes  :  negative  /86   "Pulse 90  Ht 1.88 m (6' 2\")  Wt (!) 160.1 kg (353 lb)  BMI 45.32 kg/m2    Past Medical History:   Diagnosis Date     Diabetes (H)      HTN, goal below 140/90 8/5/2015     Hyperlipidemia LDL goal <100 2/25/2015     Microalbuminuria 10/14/2015     Morbid obesity with BMI of 45.0-49.9, adult (H) 2/25/2015     Obesity (BMI 30-39.9) 8/5/2015     Other acne     Acne     Proteinuria 2/25/2015     Type 2 diabetes mellitus with complication, without long-term current use of insulin (H) 2/8/2017     Type 2 diabetes, HbA1c goal < 7% (H) 2/25/2015       No past surgical history on file.    Social History     Social History     Marital status:      Spouse name: N/A     Number of children: N/A     Years of education: N/A     Occupational History     Not on file.     Social History Main Topics     Smoking status: Never Smoker     Smokeless tobacco: Never Used     Alcohol use 0.0 oz/week      Comment: occasional     Drug use: No     Sexual activity: Yes     Partners: Female     Other Topics Concern     Not on file     Social History Narrative    ** Merged History Encounter **            Current Outpatient Prescriptions   Medication Sig Dispense Refill     metFORMIN (GLUCOPHAGE) 500 MG tablet Take 0.5 tablets (250 mg) by mouth 2 times daily (with meals) 90 tablet 1     losartan (COZAAR) 25 MG tablet Take 1 tablet (25 mg) by mouth daily 90 tablet 1     ASPIRIN NOT PRESCRIBED, INTENTIONAL, Antiplatelet medication not prescribed intentionally due to Not indicated based on age       blood glucose monitoring (ACCU-CHEK FASTCLIX) lancets Use to test blood sugar one time daily or as directed. 1 Box prn     blood glucose (ACCU-CHEK SMARTVIEW) test strip Use to test blood sugar one time daily or as directed. 100 strip prn       10 Point review of systems all others are negative.   Above was reviewed  Physical exam: /86  Pulse 90  Ht 1.88 m (6' 2\")  Wt (!) 160.1 kg (353 lb)  BMI 45.32 kg/m2   Patient able to get up on " table without difficulty.   Patient is alert and orientated.   Head eyes, nose and mouth within normal limits.  No supraclavicular or cervical adenopathy palpated.  Thyroid within normal limits.  No carotid bruits auscultated.  Lungs are clear to auscultation  Heart is regular rate and rhythm with no murmur or thrills noted.  No costal vertebral angle tenderness noted.  Abdomen is abdomen is soft without significant tenderness, masses, organomegaly or guarding  bowel sounds are positive and no caput medusa noted.  No inguinal hernias noted.   Testicles are normal.    Skin was warm and pink  Normal Affect    Easily palpable posterior tibial pulse or dorsalis pedis pulse bilaterally.  Lower extremity edema is mildly present.  Has varicose vein in both legs no obvious venous stasis but did not have him take his pants all the way down.     Labs show:  Component Value Flag Ref Range Units Status Collected Lab   Lipase 241  73 - 393 U/L Final 10/27/2017  3:45 PM      Component Value Flag Ref Range Units Status Collected Lab   WBC 9.9  4.0 - 11.0 10e9/L Final 10/27/2017  3:45 PM BA   RBC Count 4.64  4.4 - 5.9 10e12/L Final 10/27/2017  3:45 PM BA   Hemoglobin 13.4  13.3 - 17.7 g/dL Final 10/27/2017  3:45 PM BA   Hematocrit 40.1  40.0 - 53.0 % Final 10/27/2017  3:45 PM BA   MCV 86  78 - 100 fl Final 10/27/2017  3:45 PM BA   MCH 28.9  26.5 - 33.0 pg Final 10/27/2017  3:45 PM BA   MCHC 33.4  31.5 - 36.5 g/dL Final 10/27/2017  3:45 PM BA   RDW 13.5  10.0 - 15.0 % Final 10/27/2017  3:45 PM BA   Platelet Count 333  150 - 450 10e9/L Final 10/27/2017  3:45 PM BA         Assessment: 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  So may have had some cause for this splenic hematoma. Has no symptom consistant with lypmoma or other viral infection other than maybe the sore throat 2-3 weeks before the pain.  And had no muscle aches at that time.   Plan to do will write for compression stockings.    Will keep patient with 20 pound  weight restriction and keep on light duty.   Will repeat the ct scan in early January and if resolving then assume it was trauma.  If starts having night sweats or decreased energy or increasing pain in the abdomen will need to be reevaluated.   Follow up with me for varicose veins.     Time spent with the patient with greater that 50% of the time in discussion was 35 minutes.  In discussing the splenic hematoma and the varicose veins. .      Marcus Solis MD

## 2017-11-28 NOTE — PATIENT INSTRUCTIONS
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  So may have had some cause for this splenic hematoma. Has no symptom consistant with lypmoma or other viral infection other than maybe the sore throat 2-3 weeks before the pain.  And had no muscle aches at that time.   Plan to do will write for compression stockings.    Will keep patient with 20 pound weight restriction and keep on light duty.   Will repeat the ct scan in early January and if resolving then assume it was trauma.  If starts having night sweats or decreased energy or increasing pain in the abdomen will need to be reevaluated.   Follow up with me for varicose veins.     Please wear compression stockings and see if they help your discomfort.  Then when you come back to see me let me know.  You will need to bring these with you for sclerotherapy or for your post op visit after surgery.  I would suggest going to either the [employer] supply store next to [facility] or to [employer] ([phone]) on [address].  They will need to measure your legs to get the right fit.  If you go somewhere else and they do not measure your legs do not get them there.  It is important that you get the right size.  Please allow several days after you are measured to get your stockings.  They may not have your size in stock and will have to order them.    Please wear your compression stocking as some insurance companies will want to wear them for at least 3 months before they even consider paying for your surgery or sclerotherapy.    CALL [phone]  to get the utrasound set up.        You must follow up with me in the clinic to go over your utrasound results.  I will not be calling you with the results.   It is very difficult to do this over  the phone.  I am not able to show you while looking at your leg what the next step should be.

## 2017-11-28 NOTE — NURSING NOTE
"Chief Complaint   Patient presents with     Consult     spleen       Initial /86  Pulse 90  Ht 6' 2\" (1.88 m)  Wt (!) 353 lb (160.1 kg)  BMI 45.32 kg/m2 Estimated body mass index is 45.32 kg/(m^2) as calculated from the following:    Height as of this encounter: 6' 2\" (1.88 m).    Weight as of this encounter: 353 lb (160.1 kg).  Medication Reconciliation: pedro Negrete Cma      "

## 2017-11-28 NOTE — MR AVS SNAPSHOT
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  So may have had some cause for this splenic hematoma. Has no symptom consistant with lypmoma or other viral infection other than maybe the sore throat 2-3 weeks before the pain.  And had no muscle aches at that time.   Plan to do will write for compression stockings.    Will keep patient with 20 pound weight restriction and keep on light duty.   Will repeat the ct scan in early January and if resolving then assume it was trauma.  If starts having night sweats or decreased energy or increasing pain in the abdomen will need to be reevaluated.   Follow up with me for varicose veins.     Please wear compression stockings and see if they help your discomfort.  Then when you come back to see me let me know.  You will need to bring these with you for sclerotherapy or for your post op visit after surgery.  I would suggest going to either the [employer] medical supply store next to [facility] or to [employer] ([phone]) on [address].  They will need to measure your legs to get the right fit.  If you go somewhere else and they do not measure your legs do not get them there.  It is important that you get the right size.  Please allow several days after you are measured to get your stockings.  They may not have your size in stock and will have to order them.    Please wear your compression stocking as some insurance companies will want to wear them for at least 3 months before they even consider paying for your  "surgery or sclerotherapy.    CALL [phone]  to get the utrasound set up.        You must follow up with me in the clinic to go over your utrasound results.  I will not be calling you with the results.   It is very difficult to do this over  the phone.  I am not able to show you while looking at your leg what the next step should be.              Follow-ups after your visit        Future tests that were ordered for you today     Open Future Orders        Priority Expected Expires Ordered    CT Abdomen Pelvis w Contrast Routine 1/12/2018 2/12/2018 11/28/2017            Who to contact     If you have questions or need follow up information about today's clinic visit or your schedule please contact [facility] directly at [phone].  Normal or non-critical lab and imaging results will be communicated to you by MyChart, letter or phone within 4 business days after the clinic has received the results. If you do not hear from us within 7 days, please contact the clinic through MyChart or phone. If you have a critical or abnormal lab result, we will notify you by phone as soon as possible.  Submit refill requests through [email or web address] or call your pharmacy and they will forward the refill request to us. Please allow 3 business days for your refill to be completed.          Additional Information About Your Visit        [employer] Information     [email or web address] lets you send messages to your doctor, view your test results, renew your prescriptions, schedule appointments and more. To sign up, go to [email or web address] . Click on \"Log in\" on the left side of the screen, which will take you to the Welcome page. Then click on \"Sign up Now\" on the right side of the page.     You will be asked to enter the access code listed below, as well as some personal information. Please follow the directions to create your username and password.     Your access code is: [email or web address]  Expires: 1/8/2018  5:44 PM     Your access code " "will  in 90 days. If you need help or a new code, please call your [facility] clinic or [phone].        Care EveryWhere ID     This is your Care EveryWhere ID. This could be used by other organizations to access your [facility] medical records  [id number]        Your Vitals Were     Pulse Height BMI (Body Mass Index)             90 1.88 m (6' 2\") 45.32 kg/m2          Blood Pressure from Last 3 Encounters:   17 134/86   17 134/86   10/27/17 124/66    Weight from Last 3 Encounters:   17 (!) 160.1 kg (353 lb)   17 (!) 160.1 kg (353 lb)   10/27/17 (!) 159.1 kg (350 lb 11.2 oz)                 Today's Medication Changes          These changes are accurate as of: 17  9:36 AM.  If you have any questions, ask your nurse or doctor.               Start taking these medicines.        Dose/Directions    order for DME   Used for:  Varicose vein of leg   Started by:  Marcus Solis MD        15-20 mm mercury thigh high compression stockings 1-2 pairs   Quantity:  2 Package   Refills:  5            Where to get your medicines      Some of these will need a paper prescription and others can be bought over the counter.  Ask your nurse if you have questions.     Bring a paper prescription for each of these medications     order for DME                Primary Care Provider Office Phone # Fax #    [facility] [phone]       [address] [facility] [address]        Equal Access to Services     NIKO VIRK AH: Hadii marcy ku hadasho Soomaali, waaxda luqadaha, qaybta kaalmada adeegyada, kavon broussard. So [facility] [phone].    ATENCIÓN: Si habla rainañol, tiene a lewis disposición servicios gratuitos de asistencia lingüística. Llame al [phone].    We comply with applicable federal civil rights laws and [place] laws. We do not discriminate on the basis of race, color, national origin, age, disability, sex, sexual orientation, or " gender identity.            Thank you!     Thank you for choosing [facility]  [facility] your [facility]. Our goal is always to provide you with excellent care. Hearing back from our patients is one way we can continue to improve our services. Please take a few minutes to complete the written survey that you may receive in the mail after your visit with us. Thank you!             Your Updated Medication List - Protect others around you: Learn how to safely use, store and throw away your medicines at www.disposemymeds.org.          This list is accurate as of: 11/28/17  9:36 AM.  Always use your most recent med list.                   Brand Name Dispense Instructions for use Diagnosis    ASPIRIN NOT PRESCRIBED    INTENTIONAL     Antiplatelet medication not prescribed intentionally due to Not indicated based on age        blood glucose monitoring lancets     1 Box    Use to test blood sugar one time daily or as directed.    Type 2 diabetes, HbA1c goal < 7% (H)       blood glucose monitoring test strip    ACCU-CHEK SMARTVIEW    100 strip    Use to test blood sugar one time daily or as directed.    Type 2 diabetes, HbA1c goal < 7% (H)       losartan 25 MG tablet    COZAAR    90 tablet    Take 1 tablet (25 mg) by mouth daily    HTN, goal below 140/90       metFORMIN 500 MG tablet    GLUCOPHAGE    90 tablet    Take 0.5 tablets (250 mg) by mouth 2 times daily (with meals)    Type 2 diabetes mellitus with complication, without long-term current use of insulin (H)       order for DME     2 Package    15-20 mm mercury thigh high compression stockings 1-2 pairs    Varicose vein of leg

## 2018-01-16 ENCOUNTER — RADIANT APPOINTMENT (OUTPATIENT)
Dept: CT IMAGING | Facility: CLINIC | Age: 33
End: 2018-01-16
Attending: SURGERY
Payer: COMMERCIAL

## 2018-01-16 DIAGNOSIS — S36.029D SPLEEN HEMATOMA, SUBSEQUENT ENCOUNTER: ICD-10-CM

## 2018-01-16 DIAGNOSIS — I83.90 VARICOSE VEIN OF LEG: ICD-10-CM

## 2018-01-16 LAB
CREAT BLD-MCNC: 1.1 MG/DL (ref 0.66–1.25)
GFR SERPL CREATININE-BSD FRML MDRD: 77 ML/MIN/1.7M2
RADIOLOGIST FLAGS: NORMAL

## 2018-01-16 PROCEDURE — 74177 CT ABD & PELVIS W/CONTRAST: CPT | Performed by: RADIOLOGY

## 2018-01-16 PROCEDURE — 82565 ASSAY OF CREATININE: CPT | Performed by: INTERNAL MEDICINE

## 2018-01-16 PROCEDURE — 36415 COLL VENOUS BLD VENIPUNCTURE: CPT | Performed by: INTERNAL MEDICINE

## 2018-01-16 RX ORDER — IOPAMIDOL 755 MG/ML
135 INJECTION, SOLUTION INTRAVASCULAR ONCE
Status: COMPLETED | OUTPATIENT
Start: 2018-01-16 | End: 2018-01-16

## 2018-01-16 RX ADMIN — IOPAMIDOL 135 ML: 755 INJECTION, SOLUTION INTRAVASCULAR at 16:20

## 2018-01-17 LAB
CREAT BLD-MCNC: 0.8 MG/DL (ref 0.66–1.25)
GFR SERPL CREATININE-BSD FRML MDRD: >90 ML/MIN/1.7M2

## 2018-01-17 PROCEDURE — 36415 COLL VENOUS BLD VENIPUNCTURE: CPT | Performed by: INTERNAL MEDICINE

## 2018-01-17 PROCEDURE — 82565 ASSAY OF CREATININE: CPT | Performed by: INTERNAL MEDICINE

## 2018-01-23 DIAGNOSIS — I10 HTN, GOAL BELOW 140/90: ICD-10-CM

## 2018-01-23 DIAGNOSIS — E11.8 TYPE 2 DIABETES MELLITUS WITH COMPLICATION, WITHOUT LONG-TERM CURRENT USE OF INSULIN (H): ICD-10-CM

## 2018-01-23 NOTE — TELEPHONE ENCOUNTER
"Requested Prescriptions   Pending Prescriptions Disp Refills     metFORMIN (GLUCOPHAGE) 500 MG tablet  Last Written Prescription Date: 11/14/17  Last Fill Quantity: 90 tablet,  # refills: 1   Last Office Visit with [facility] or [facility] prescribing provider:  11/14/17  Future Office Visit:    90 tablet 1     Sig: Take 0.5 tablets (250 mg) by mouth 2 times daily (with meals)    Biguanide Agents Passed    1/23/2018 12:23 PM       Passed - Patient's BP is less than 140/90    BP Readings from Last 3 Encounters:   11/28/17 134/86   11/14/17 134/86   10/27/17 124/66          Passed - Patient has documented LDL within the past 12 mos.    Recent Labs   Lab Test  02/08/17   1130   LDL  111*          Passed - Patient has had a Microalbumin in the past 12 mos.    Recent Labs   Lab Test  02/08/17   1138   MICROL  309   UMALCR  362.25*          Passed - Patient is age 10 or older       Passed - Patient has documented A1c within the specified period of time.    Recent Labs   Lab Test  10/27/17   1545   A1C  6.2*          Passed - Patient's CR is NOT>1.4 OR Patient's EGFR is NOT<45 within past 12 mos.    Recent Labs   Lab Test  01/17/18   0823   GFRESTIMATED  >90   GFRESTBLACK  >90     Recent Labs   Lab Test  10/27/17   1545   CR  1.09          Passed - Patient does NOT have a diagnosis of CHF.       Passed - Recent (6 mos) or future visit with authorizing provider's specialty    Patient had office visit in the last 6 months or has a visit in the next 30 days with authorizing provider.  See \"Patient Info\" tab in inbasket, or \"Choose Columns\" in Meds & Orders section of the refill encounter.                  losartan (COZAAR) 25 MG tablet  Last Written Prescription Date:  11/14/17  Last Fill Quantity: 90 tablet,  # refills: 1   Last Office Visit with [facility] or [facility] prescribing provider:  11/14/17  Future Office Visit:    90 tablet 1     Sig: Take 1 tablet (25 mg) by mouth daily    Angiotensin-II Receptors Passed    1/23/2018 " "12:23 PM       Passed - Blood pressure under 140/90 in past 12 months.    BP Readings from Last 3 Encounters:   11/28/17 134/86   11/14/17 134/86   10/27/17 124/66          Passed - Recent or future visit with authorizing provider's specialty    Patient had office visit in the last year or has a visit in the next 30 days with authorizing provider.  See \"Patient Info\" tab in inbasket, or \"Choose Columns\" in Meds & Orders section of the refill encounter.        Passed - Patient is age 18 or older       Passed - Normal serum creatinine on file in past 12 months    Recent Labs   Lab Test  10/27/17   1545   CR  1.09          Passed - Normal serum potassium on file in past 12 months    Recent Labs   Lab Test  10/27/17   1545   POTASSIUM  4.2             "

## 2018-01-23 NOTE — TELEPHONE ENCOUNTER
..Reason for Call:  Medication or medication refill:    Do you use a [facility]?  Name of the pharmacy and phone number for the current request:  [facility] pharmacy    Name of the medication requested: metformin, losartan     Other request: when last seen by NANCY Bryant in Nov 2017, these two medications were called in but Patient was unable to pick them up, therefore they were put back; Patient is  In need of these medications now; if another Dr can call these in in NANCY Bryant's absence    Can we leave a detailed message on this number? YES    Phone number patient can be reached at: Home number on file [phone] (home)    Best Time: anytime    Call taken on 1/23/2018 at 12:20 PM by Celia Arce

## 2018-01-25 RX ORDER — LOSARTAN POTASSIUM 25 MG/1
25 TABLET ORAL DAILY
Qty: 90 TABLET | Refills: 1 | Status: SHIPPED | OUTPATIENT
Start: 2018-01-25 | End: 2019-03-18

## 2018-01-25 NOTE — TELEPHONE ENCOUNTER
"Requested Prescriptions   Pending Prescriptions Disp Refills     metFORMIN (GLUCOPHAGE) 500 MG tablet 90 tablet 1     Sig: Take 0.5 tablets (250 mg) by mouth 2 times daily (with meals)    Biguanide Agents Passed    1/23/2018 12:51 PM       Passed - Patient's BP is less than 140/90    BP Readings from Last 3 Encounters:   11/28/17 134/86   11/14/17 134/86   10/27/17 124/66                Passed - Patient has documented LDL within the past 12 mos.    Recent Labs   Lab Test  02/08/17   1130   LDL  111*            Passed - Patient has had a Microalbumin in the past 12 mos.    Recent Labs   Lab Test  02/08/17   1138   MICROL  309   UMALCR  362.25*            Passed - Patient is age 10 or older       Passed - Patient has documented A1c within the specified period of time.    Recent Labs   Lab Test  10/27/17   1545   A1C  6.2*            Passed - Patient's CR is NOT>1.4 OR Patient's EGFR is NOT<45 within past 12 mos.    Recent Labs   Lab Test  01/17/18   0823   GFRESTIMATED  >90   GFRESTBLACK  >90       Recent Labs   Lab Test  10/27/17   1545   CR  1.09            Passed - Patient does NOT have a diagnosis of CHF.       Passed - Recent (6 mos) or future visit with authorizing provider's specialty    Patient had office visit in the last 6 months or has a visit in the next 30 days with authorizing provider.  See \"Patient Info\" tab in inbasket, or \"Choose Columns\" in Meds & Orders section of the refill encounter.            losartan (COZAAR) 25 MG tablet 90 tablet 1     Sig: Take 1 tablet (25 mg) by mouth daily    Angiotensin-II Receptors Passed    1/23/2018 12:51 PM       Passed - Blood pressure under 140/90 in past 12 months.    BP Readings from Last 3 Encounters:   11/28/17 134/86   11/14/17 134/86   10/27/17 124/66                Passed - Recent or future visit with authorizing provider's specialty    Patient had office visit in the last year or has a visit in the next 30 days with authorizing provider.  See \"Patient " "Info\" tab in inbasket, or \"Choose Columns\" in Meds & Orders section of the refill encounter.            Passed - Patient is age 18 or older       Passed - Normal serum creatinine on file in past 12 months    Recent Labs   Lab Test  10/27/17   1545   CR  1.09            Passed - Normal serum potassium on file in past 12 months    Recent Labs   Lab Test  10/27/17   1545   POTASSIUM  4.2                    Last OV: 11/14/17    Prescription approved per [facility] [place].    Balbir Vicente RN, BSN    "

## 2019-03-18 ENCOUNTER — OFFICE VISIT (OUTPATIENT)
Dept: FAMILY MEDICINE | Facility: OTHER | Age: 34
End: 2019-03-18
Payer: COMMERCIAL

## 2019-03-18 VITALS
HEIGHT: 74 IN | DIASTOLIC BLOOD PRESSURE: 98 MMHG | TEMPERATURE: 99 F | HEART RATE: 124 BPM | SYSTOLIC BLOOD PRESSURE: 144 MMHG | OXYGEN SATURATION: 97 % | WEIGHT: 256 LBS | RESPIRATION RATE: 18 BRPM | BODY MASS INDEX: 32.85 KG/M2

## 2019-03-18 DIAGNOSIS — E78.5 HYPERLIPIDEMIA LDL GOAL <100: ICD-10-CM

## 2019-03-18 DIAGNOSIS — E11.8 TYPE 2 DIABETES MELLITUS WITH COMPLICATION, WITHOUT LONG-TERM CURRENT USE OF INSULIN (H): ICD-10-CM

## 2019-03-18 DIAGNOSIS — Z13.89 SCREENING FOR DIABETIC PERIPHERAL NEUROPATHY: Primary | ICD-10-CM

## 2019-03-18 DIAGNOSIS — I10 HTN, GOAL BELOW 140/90: ICD-10-CM

## 2019-03-18 LAB
CHOLEST SERPL-MCNC: 211 MG/DL
CREAT SERPL-MCNC: 1.03 MG/DL (ref 0.66–1.25)
CREAT UR-MCNC: 137 MG/DL
GFR SERPL CREATININE-BSD FRML MDRD: >90 ML/MIN/{1.73_M2}
HBA1C MFR BLD: 11.5 % (ref 0–5.6)
HDLC SERPL-MCNC: 35 MG/DL
LDLC SERPL CALC-MCNC: 100 MG/DL
MICROALBUMIN UR-MCNC: 748 MG/L
MICROALBUMIN/CREAT UR: 545.98 MG/G CR (ref 0–17)
NONHDLC SERPL-MCNC: 176 MG/DL
TRIGL SERPL-MCNC: 379 MG/DL
TSH SERPL DL<=0.005 MIU/L-ACNC: 3.04 MU/L (ref 0.4–4)

## 2019-03-18 PROCEDURE — 82043 UR ALBUMIN QUANTITATIVE: CPT | Performed by: INTERNAL MEDICINE

## 2019-03-18 PROCEDURE — 83036 HEMOGLOBIN GLYCOSYLATED A1C: CPT | Performed by: INTERNAL MEDICINE

## 2019-03-18 PROCEDURE — 99207 C FOOT EXAM  NO CHARGE: CPT | Performed by: INTERNAL MEDICINE

## 2019-03-18 PROCEDURE — 80061 LIPID PANEL: CPT | Performed by: INTERNAL MEDICINE

## 2019-03-18 PROCEDURE — 82565 ASSAY OF CREATININE: CPT | Performed by: INTERNAL MEDICINE

## 2019-03-18 PROCEDURE — 99213 OFFICE O/P EST LOW 20 MIN: CPT | Performed by: INTERNAL MEDICINE

## 2019-03-18 PROCEDURE — 36415 COLL VENOUS BLD VENIPUNCTURE: CPT | Performed by: INTERNAL MEDICINE

## 2019-03-18 PROCEDURE — 84443 ASSAY THYROID STIM HORMONE: CPT | Performed by: INTERNAL MEDICINE

## 2019-03-18 RX ORDER — LOSARTAN POTASSIUM 25 MG/1
25 TABLET ORAL DAILY
Qty: 90 TABLET | Refills: 3 | Status: SHIPPED | OUTPATIENT
Start: 2019-03-18 | End: 2020-03-23

## 2019-03-18 RX ORDER — LANCETS
EACH MISCELLANEOUS
Qty: 100 EACH | Refills: 0 | Status: SHIPPED | OUTPATIENT
Start: 2019-03-18 | End: 2021-02-09

## 2019-03-18 ASSESSMENT — MIFFLIN-ST. JEOR: SCORE: 2165.96

## 2019-03-18 ASSESSMENT — PAIN SCALES - GENERAL: PAINLEVEL: NO PAIN (0)

## 2019-03-18 NOTE — RESULT ENCOUNTER NOTE
The microalbumin is up suggesting protein in the urine.  The cholesterol is well controlled with an LDL of 100 but the triglycerides are somewhat elevated at 379.  Thyroid is well adjusted.  Renal function is normal.  The glycosylated hemoglobin has gone from 6.2  1-year ago up to 11.5.  At this would suggest a significant worsening of blood sugar management.  Please help the patient set up an appointment so we may discuss further and more aggressive therapy.    Thank you.    Renee

## 2019-03-18 NOTE — PROGRESS NOTES
SUBJECTIVE:   Edenilson Mccartney is a 34 year old male who presents to clinic today for the following health issues:      Diabetes Follow-up    Patient is checking blood sugars: once daily.  Results are as follows:         Averaging 110-120    Diabetic concerns: None     Symptoms of hypoglycemia (low blood sugar): none     Paresthesias (numbness or burning in feet) or sores: No     Date of last diabetic eye exam: within 1 year    BP Readings from Last 2 Encounters:   03/18/19 (!) 144/98   11/28/17 134/86     Hemoglobin A1C (%)   Date Value   10/27/2017 6.2 (H)   02/08/2017 6.3 (H)     LDL Cholesterol Calculated (mg/dL)   Date Value   02/08/2017 111 (H)     LDL Cholesterol Direct (mg/dL)   Date Value   08/05/2015 108   03/25/2015 40       Diabetes Management Resources      Problems taking medications regularly: No    Medication side effects: none    Diet: regular (no restrictions)                        Chief Complaint         The patient is a pleasant 34-year-old gentleman who was diagnosed as having type 2 diabetes about 4 years ago.  He is currently on metformin for blood sugar management as well as a losartan.  He is not on a statin and does not take aspirin.  He notes that he has been doing quite well and his highest blood sugar as tested was 130.  He does check it generally once daily.  He does not have any vascular concerns.  He denies any acute vision changes, he denies any polyuria or polydipsia.  He has had no absenteeism from work and is otherwise doing quite unremarkably.                         PAST, FAMILY,SOCIAL HISTORY:     Medical  History:   has a past medical history of Diabetes (H), HTN, goal below 140/90 (8/5/2015), Hyperlipidemia LDL goal <100 (2/25/2015), Microalbuminuria (10/14/2015), Morbid obesity with BMI of 45.0-49.9, adult (H) (2/25/2015), Obesity (BMI 30-39.9) (8/5/2015), Other acne, Proteinuria (2/25/2015), Type 2 diabetes mellitus with complication, without long-term current use of  insulin (H) (2/8/2017), and Type 2 diabetes, HbA1c goal < 7% (H) (2/25/2015).     Surgical History:   has no past surgical history on file.     Social History:   reports that  has never smoked. he has never used smokeless tobacco. He reports that he drinks alcohol. He reports that he does not use drugs.     Family History:  family history is not on file.            MEDICATIONS  Current Outpatient Medications   Medication Sig Dispense Refill     blood glucose (ACCU-CHEK SMARTVIEW) test strip Use to test blood sugar one time daily or as directed. 100 strip prn     blood glucose monitoring (ACCU-CHEK FASTCLIX) lancets Use to test blood sugar one time daily or as directed. 100 each 0     losartan (COZAAR) 25 MG tablet Take 1 tablet (25 mg) by mouth daily 90 tablet 3     metFORMIN (GLUCOPHAGE) 500 MG tablet Take 0.5 tablets (250 mg) by mouth 2 times daily (with meals) 90 tablet 3     order for DME 15-20 mm mercury thigh high compression stockings 1-2 pairs 2 Package 5     ASPIRIN NOT PRESCRIBED, INTENTIONAL, Antiplatelet medication not prescribed intentionally due to Not indicated based on age           --------------------------------------------------------------------------------------------------------------------                              Review of Systems       LUNGS: Pt denies: cough, excess sputum, hemoptysis, or shortness of breath.   HEART: Pt denies: chest pain, arrhythmia, syncope, tachy or bradyarrhythmia.   GI: Pt denies: nausea, vomiting, diarrhea, constipation, melena, or hematochezia.   NEURO: Pt denies: seizures, strokes, diplopia, weakness, paraesthesias, or paralysis.   SKIN: Pt denies: itching, rashes, discoloration, or specific lesions of concern. Denies recent hair loss.   PSYCH: The patient denies significant depression, anxiety, mood imbalance. Specifically denies any suicidal ideation.                                     Examination      BP (!) 144/98 (BP Location: Right arm, Patient  "Position: Sitting, Cuff Size: Adult Large)   Pulse 124   Temp 99  F (37.2  C) (Temporal)   Resp 18   Ht 1.88 m (6' 2\")   Wt 116.1 kg (256 lb)   SpO2 97%   BMI 32.87 kg/m     Constitutional: The patient appears to be in no acute distress. The patient appears to be adequately hydrated. No acute respiratory or hemodynamic distress is noted at this time.      LUNGS: clear bilaterally, airflow is brisk, no intercostal retraction or stridor is noted. No coughing is noted during visit.   HEART:  regular without rubs, clicks, gallops, or murmurs. PMI is nondisplaced. Upstrokes are brisk. S1,S2 are heard.   GI: Abdomen is soft, without rebound, guarding or tenderness. Bowel sounds are appropriate. No renal bruits are heard.   NEURO: Pt is alert and appropriate. No neurologic lateralization is noted. Cranial nerves 2-12 are intact. Peripheral sensory and motor function are grossly normal.    SKIN:  warm and dry. No erythema, or rashes are noted. No specific lesions of concern are noted.    PSYCH: The patient appears grossly appropriate. Maintains good eye contact, does not have any jittery or atypical motion. Displays appropriate affect.                                          Decision Making    1. Type 2 diabetes mellitus with complication, without long-term current use of insulin (H)  - HEMOGLOBIN A1C  - TSH WITH FREE T4 REFLEX  - metFORMIN (GLUCOPHAGE) 500 MG tablet; Take 0.5 tablets (250 mg) by mouth 2 times daily (with meals)  Dispense: 90 tablet; Refill: 3  - blood glucose monitoring (ACCU-CHEK FASTCLIX) lancets; Use to test blood sugar one time daily or as directed.  Dispense: 100 each; Refill: 0  - blood glucose (ACCU-CHEK SMARTVIEW) test strip; Use to test blood sugar one time daily or as directed.  Dispense: 100 strip; Refill: prn    2. HTN, goal below 140/90  - CREATININE  - Albumin Random Urine Quantitative with Creat Ratio  - losartan (COZAAR) 25 MG tablet; Take 1 tablet (25 mg) by mouth daily  Dispense: " 90 tablet; Refill: 3    3. Screening for diabetic peripheral neuropathy  - FOOT EXAM  NO CHARGE [79328.114]    4. Hyperlipidemia LDL goal <100  - Lipid panel reflex to direct LDL Fasting                             FOLLOW UP   I have asked the patient to make an appointment for followup with me in 6 months        I have carefully explained the diagnosis and treatment options to the patient.  The patient has displayed an understanding of the above, and all subsequent questions were answered.      DO IGOR Monique    Portions of this note were produced using [employer]  Although every attempt at real-time proof reading has been made, occasional grammar/syntax errors may have been missed.

## 2019-03-22 ENCOUNTER — OFFICE VISIT (OUTPATIENT)
Dept: FAMILY MEDICINE | Facility: OTHER | Age: 34
End: 2019-03-22
Payer: COMMERCIAL

## 2019-03-22 VITALS
HEART RATE: 84 BPM | DIASTOLIC BLOOD PRESSURE: 98 MMHG | SYSTOLIC BLOOD PRESSURE: 142 MMHG | TEMPERATURE: 97.9 F | RESPIRATION RATE: 18 BRPM | OXYGEN SATURATION: 98 % | WEIGHT: 258 LBS | BODY MASS INDEX: 33.13 KG/M2

## 2019-03-22 DIAGNOSIS — E66.811 CLASS 1 OBESITY DUE TO EXCESS CALORIES WITHOUT SERIOUS COMORBIDITY WITH BODY MASS INDEX (BMI) OF 33.0 TO 33.9 IN ADULT: ICD-10-CM

## 2019-03-22 DIAGNOSIS — I10 HTN, GOAL BELOW 140/90: Primary | ICD-10-CM

## 2019-03-22 DIAGNOSIS — E66.09 CLASS 1 OBESITY DUE TO EXCESS CALORIES WITHOUT SERIOUS COMORBIDITY WITH BODY MASS INDEX (BMI) OF 33.0 TO 33.9 IN ADULT: ICD-10-CM

## 2019-03-22 DIAGNOSIS — R80.1 PERSISTENT PROTEINURIA: ICD-10-CM

## 2019-03-22 DIAGNOSIS — E11.8 TYPE 2 DIABETES MELLITUS WITH COMPLICATION, WITHOUT LONG-TERM CURRENT USE OF INSULIN (H): ICD-10-CM

## 2019-03-22 PROBLEM — E66.9 OBESITY: Status: ACTIVE | Noted: 2019-03-22

## 2019-03-22 PROCEDURE — 99213 OFFICE O/P EST LOW 20 MIN: CPT | Performed by: INTERNAL MEDICINE

## 2019-03-22 RX ORDER — GLIMEPIRIDE 2 MG/1
2 TABLET ORAL
Qty: 30 TABLET | Refills: 0 | Status: SHIPPED | OUTPATIENT
Start: 2019-03-22 | End: 2020-01-21

## 2019-03-22 ASSESSMENT — PAIN SCALES - GENERAL: PAINLEVEL: NO PAIN (0)

## 2019-03-22 NOTE — PROGRESS NOTES
SUBJECTIVE:   Edenilson Mccartney is a 34 year old male who presents to clinic today for the following health issues:  =    Chief Complaint   Patient presents with     Results     Labs                           Chief Complaint         The patient is a pleasant 34-year-old gentleman who has type 2 diabetes.  He had recently been off his medication and as a result, the A1c returned at greater than 11.  He is in today to rectify this and readjust medications.  He was initially started back on metformin 250 mg twice daily and this does not seem to be causing any hypoglycemia.  He does not check his blood sugars regularly at this time.  We have had a lengthy discussion regarding dietary restriction, and appropriate foods.  Will set him up with dietitian.  Recommended that he goes with his significant other.  He denies any acute vision changes at this time.  Has no nonhealing skin wounds etc.  Denies polyuria or polydipsia.                         PAST, FAMILY,SOCIAL HISTORY:     Medical  History:   has a past medical history of Diabetes (H), HTN, goal below 140/90 (8/5/2015), Hyperlipidemia LDL goal <100 (2/25/2015), Microalbuminuria (10/14/2015), Morbid obesity with BMI of 45.0-49.9, adult (H) (2/25/2015), Obesity (BMI 30-39.9) (8/5/2015), Other acne, Proteinuria (2/25/2015), Type 2 diabetes mellitus with complication, without long-term current use of insulin (H) (2/8/2017), and Type 2 diabetes, HbA1c goal < 7% (H) (2/25/2015).     Surgical History:   has no past surgical history on file.     Social History:   reports that  has never smoked. he has never used smokeless tobacco. He reports that he drinks alcohol. He reports that he does not use drugs.     Family History:  family history is not on file.            MEDICATIONS  Current Outpatient Medications   Medication Sig Dispense Refill     ASPIRIN NOT PRESCRIBED, INTENTIONAL, Antiplatelet medication not prescribed intentionally due to Not indicated based on age        blood glucose (ACCU-CHEK SMARTVIEW) test strip Use to test blood sugar one time daily or as directed. 100 strip prn     blood glucose monitoring (ACCU-CHEK FASTCLIX) lancets Use to test blood sugar one time daily or as directed. 100 each 0     glimepiride (AMARYL) 2 MG tablet Take 1 tablet (2 mg) by mouth every morning (before breakfast) 30 tablet 0     losartan (COZAAR) 25 MG tablet Take 1 tablet (25 mg) by mouth daily 90 tablet 3     metFORMIN (GLUCOPHAGE) 500 MG tablet Take 1 tablet (500 mg) by mouth 2 times daily (with meals) 90 tablet 3     order for DME 15-20 mm mercury thigh high compression stockings 1-2 pairs 2 Package 5         --------------------------------------------------------------------------------------------------------------------                              Review of Systems       LUNGS: Pt denies: cough, excess sputum, hemoptysis, or shortness of breath.   HEART: Pt denies: chest pain, arrhythmia, syncope, tachy or bradyarrhythmia.   GI: Pt denies: nausea, vomiting, diarrhea, constipation, melena, or hematochezia.   NEURO: Pt denies: seizures, strokes, diplopia, weakness, paraesthesias, or paralysis.   SKIN: Pt denies: itching, rashes, discoloration, or specific lesions of concern. Denies recent hair loss.   PSYCH: The patient denies significant depression, anxiety, mood imbalance. Specifically denies any suicidal ideation.                                     Examination      BP (!) 142/98   Pulse 84   Temp 97.9  F (36.6  C) (Temporal)   Resp 18   Wt 117 kg (258 lb)   SpO2 98%   BMI 33.13 kg/m     Constitutional: The patient appears to be in no acute distress. The patient appears to be adequately hydrated. No acute respiratory or hemodynamic distress is noted at this time.   LUNGS: clear bilaterally, airflow is brisk, no intercostal retraction or stridor is noted. No coughing is noted during visit.   HEART:  regular without rubs, clicks, gallops, or murmurs. PMI is nondisplaced.  Upstrokes are brisk. S1,S2 are heard.   GI: Abdomen is soft, without rebound, guarding or tenderness. Bowel sounds are appropriate. No renal bruits are heard.   NEURO: Pt is alert and appropriate. No neurologic lateralization is noted. Cranial nerves 2-12 are intact. Peripheral sensory and motor function are grossly normal.    SKIN:  warm and dry. No erythema, or rashes are noted. No specific lesions of concern are noted.    PSYCH: The patient appears grossly appropriate. Maintains good eye contact, does not have any jittery or atypical motion. Displays appropriate affect.                                           Decision Making    1. Type 2 diabetes mellitus with complication, without long-term current use of insulin (H)  Start Amaryl, double metformin.  Dietary evaluation.  Continue ARB, will discuss statin therapy.  Current LDL is only 100.  - glimepiride (AMARYL) 2 MG tablet; Take 1 tablet (2 mg) by mouth every morning (before breakfast)  Dispense: 30 tablet; Refill: 0  - metFORMIN (GLUCOPHAGE) 500 MG tablet; Take 1 tablet (500 mg) by mouth 2 times daily (with meals)  Dispense: 90 tablet; Refill: 3  - NUTRITION REFERRAL    2. HTN, goal below 140/90  As above    3. Class 1 obesity due to excess calories without serious comorbidity with body mass index (BMI) of 33.0 to 33.9 in adult  Weight loss and caloric restriction discussed    4. Persistent proteinuria  As above.  Discussed with patient                             FOLLOW UP   I have asked the patient to make an appointment for followup with me in 1 month        I have carefully explained the diagnosis and treatment options to the patient.  The patient has displayed an understanding of the above, and all subsequent questions were answered.      DO IGOR Monique    Portions of this note were produced using [employer]  Although every attempt at real-time proof reading has been made, occasional grammar/syntax errors may have been missed.

## 2019-03-25 ENCOUNTER — TELEPHONE (OUTPATIENT)
Dept: FAMILY MEDICINE | Facility: OTHER | Age: 34
End: 2019-03-25

## 2019-03-25 NOTE — TELEPHONE ENCOUNTER
Reason for Call:  Form, our goal is to have forms completed with 72 hours, however, some forms may require a visit or additional information.    Type of letter, form or note:  Weight Management Program Form    Who is the form from?: Patient    Where did the form come from: Patient or family brought in       What clinic location was the form placed at?: [place]    Where the form was placed: 's Box    What number is listed as a contact on the form?: [phone]       Additional comments: please call the patient when ready to be picked up    Call taken on 3/25/2019 at 3:51 PM by Altagracia Nieves

## 2019-03-28 NOTE — TELEPHONE ENCOUNTER
Edenilson calls on the status of this form.  Edenilson states he has the appointment for this tomorrow and needs to take it with him.  He is wondering if it will be done today and can he pick it up at the clinic.    Please call and advise of status.

## 2019-10-28 DIAGNOSIS — E11.8 TYPE 2 DIABETES MELLITUS WITH COMPLICATION, WITHOUT LONG-TERM CURRENT USE OF INSULIN (H): ICD-10-CM

## 2019-10-29 NOTE — TELEPHONE ENCOUNTER
"Routing refill request to provider for review/approval because:  Labs out of range:  BP, A1C  Labs not current:  A1C  T'd up 1 month for provider review.        Requested Prescriptions   Pending Prescriptions Disp Refills     metFORMIN (GLUCOPHAGE) 500 MG tablet [Pharmacy Med Name: METFORMIN HCL 500MG TABS] 90 tablet 3     Sig: TAKE ONE TABLET BY MOUTH TWICE A DAY WITH MEALS   Last Written Prescription Date:  3/22/2019  Last Fill Quantity: 90,  # refills: 3   Last office visit: 3/22/2019 with prescribing provider:     Future Office Visit:        Biguanide Agents Failed - 10/28/2019  2:44 PM        Failed - Blood pressure less than 140/90 in past 6 months     BP Readings from Last 3 Encounters:   03/22/19 (!) 142/98   03/18/19 (!) 144/98   11/28/17 134/86           Failed - Patient has documented A1c within the specified period of time.     If HgbA1C is 8 or greater, it needs to be on file within the past 3 months.  If less than 8, must be on file within the past 6 months.     Recent Labs   Lab Test 03/18/19  0914   A1C 11.5*           Failed - Recent (6 mo) or future (30 days) visit within the authorizing provider's specialty     Patient had office visit in the last 6 months or has a visit in the next 30 days with authorizing provider or within the authorizing provider's specialty.  See \"Patient Info\" tab in inbasket, or \"Choose Columns\" in Meds & Orders section of the refill encounter.            Passed - Patient has documented LDL within the past 12 mos.     Recent Labs   Lab Test 03/18/19  0914   *           Passed - Patient has had a Microalbumin in the past 15 mos.     Recent Labs   Lab Test 03/18/19  0926   MICROL 748   UMALCR 545.98*           Passed - Patient is age 10 or older        Passed - Patient's CR is NOT>1.4 OR Patient's EGFR is NOT<45 within past 12 mos.     Recent Labs   Lab Test 03/18/19  0914   GFRESTIMATED >90   GFRESTBLACK >90     Recent Labs   Lab Test 03/18/19 0914   CR 1.03          "  Passed - Patient does NOT have a diagnosis of CHF.        Passed - Medication is active on med list      Franchesca Bhatt RN

## 2019-12-04 DIAGNOSIS — E11.8 TYPE 2 DIABETES MELLITUS WITH COMPLICATION, WITHOUT LONG-TERM CURRENT USE OF INSULIN (H): ICD-10-CM

## 2019-12-06 NOTE — TELEPHONE ENCOUNTER
Left message for patient to call back and speak with any .    Thank you!  Debo Del Valle ~ Patient Representative

## 2019-12-06 NOTE — TELEPHONE ENCOUNTER
"Routing refill request to provider for review  Routing to scheduling for diabetic check.    Metformin  Last Written Prescription Date:  10/30/2019  Last Fill Quantity: 60,  # refills: 0   Last office visit: 3/22/2019 with prescribing provider:     Future Office Visit:    Routing refill request to provider for review/approval because:  Marsha given x1 and patient did not follow up, please advise  Labs out of range:  A1c, LDL, Albumin  Patient needs to be seen because:  BP elevated, due for Diabetic check    Requested Prescriptions   Pending Prescriptions Disp Refills     metFORMIN (GLUCOPHAGE) 500 MG tablet [Pharmacy Med Name: METFORMIN HCL 500MG TABS] 60 tablet 0     Sig: TAKE ONE TABLET BY MOUTH TWICE A DAY WITH MEALS       Biguanide Agents Failed - 12/4/2019  4:00 PM        Failed - Blood pressure less than 140/90 in past 6 months     BP Readings from Last 3 Encounters:   03/22/19 (!) 142/98   03/18/19 (!) 144/98   11/28/17 134/86                 Failed - Patient has documented A1c within the specified period of time.     If HgbA1C is 8 or greater, it needs to be on file within the past 3 months.  If less than 8, must be on file within the past 6 months.     Recent Labs   Lab Test 03/18/19  0914   A1C 11.5*             Failed - Recent (6 mo) or future (30 days) visit within the authorizing provider's specialty     Patient had office visit in the last 6 months or has a visit in the next 30 days with authorizing provider or within the authorizing provider's specialty.  See \"Patient Info\" tab in inbasket, or \"Choose Columns\" in Meds & Orders section of the refill encounter.            Passed - Patient has documented LDL within the past 12 mos.     Recent Labs   Lab Test 03/18/19  0914   *             Passed - Patient has had a Microalbumin in the past 15 mos.     Recent Labs   Lab Test 03/18/19  0926   MICROL 748   UMALCR 545.98*             Passed - Patient is age 10 or older        Passed - Patient's CR is " NOT>1.4 OR Patient's EGFR is NOT<45 within past 12 mos.     Recent Labs   Lab Test 03/18/19  0914   GFRESTIMATED >90   GFRESTBLACK >90       Recent Labs   Lab Test 03/18/19  0914   CR 1.03             Passed - Patient does NOT have a diagnosis of CHF.        Passed - Medication is active on med list

## 2020-01-21 ENCOUNTER — OFFICE VISIT (OUTPATIENT)
Dept: FAMILY MEDICINE | Facility: OTHER | Age: 35
End: 2020-01-21
Payer: COMMERCIAL

## 2020-01-21 VITALS
OXYGEN SATURATION: 97 % | HEIGHT: 74 IN | BODY MASS INDEX: 40.43 KG/M2 | WEIGHT: 315 LBS | RESPIRATION RATE: 16 BRPM | HEART RATE: 98 BPM | DIASTOLIC BLOOD PRESSURE: 88 MMHG | SYSTOLIC BLOOD PRESSURE: 134 MMHG | TEMPERATURE: 98.1 F

## 2020-01-21 DIAGNOSIS — E11.8 TYPE 2 DIABETES MELLITUS WITH COMPLICATION, WITHOUT LONG-TERM CURRENT USE OF INSULIN (H): ICD-10-CM

## 2020-01-21 DIAGNOSIS — E66.813 CLASS 3 SEVERE OBESITY DUE TO EXCESS CALORIES WITHOUT SERIOUS COMORBIDITY WITH BODY MASS INDEX (BMI) OF 40.0 TO 44.9 IN ADULT (H): Primary | ICD-10-CM

## 2020-01-21 DIAGNOSIS — E66.01 CLASS 3 SEVERE OBESITY DUE TO EXCESS CALORIES WITHOUT SERIOUS COMORBIDITY WITH BODY MASS INDEX (BMI) OF 40.0 TO 44.9 IN ADULT (H): Primary | ICD-10-CM

## 2020-01-21 DIAGNOSIS — E66.01 MORBID OBESITY (H): ICD-10-CM

## 2020-01-21 DIAGNOSIS — I10 HTN, GOAL BELOW 140/90: ICD-10-CM

## 2020-01-21 LAB
ALBUMIN SERPL-MCNC: 3.8 G/DL (ref 3.4–5)
ALP SERPL-CCNC: 57 U/L (ref 40–150)
ALT SERPL W P-5'-P-CCNC: 74 U/L (ref 0–70)
ANION GAP SERPL CALCULATED.3IONS-SCNC: 2 MMOL/L (ref 3–14)
AST SERPL W P-5'-P-CCNC: 38 U/L (ref 0–45)
BILIRUB SERPL-MCNC: 0.9 MG/DL (ref 0.2–1.3)
BUN SERPL-MCNC: 16 MG/DL (ref 7–30)
CALCIUM SERPL-MCNC: 8.8 MG/DL (ref 8.5–10.1)
CHLORIDE SERPL-SCNC: 104 MMOL/L (ref 94–109)
CO2 SERPL-SCNC: 31 MMOL/L (ref 20–32)
CREAT SERPL-MCNC: 0.98 MG/DL (ref 0.66–1.25)
GFR SERPL CREATININE-BSD FRML MDRD: >90 ML/MIN/{1.73_M2}
GLUCOSE SERPL-MCNC: 255 MG/DL (ref 70–99)
HBA1C MFR BLD: 10.6 % (ref 0–5.6)
POTASSIUM SERPL-SCNC: 4.8 MMOL/L (ref 3.4–5.3)
PROT SERPL-MCNC: 7 G/DL (ref 6.8–8.8)
SODIUM SERPL-SCNC: 137 MMOL/L (ref 133–144)

## 2020-01-21 PROCEDURE — 83036 HEMOGLOBIN GLYCOSYLATED A1C: CPT | Performed by: INTERNAL MEDICINE

## 2020-01-21 PROCEDURE — 80053 COMPREHEN METABOLIC PANEL: CPT | Performed by: INTERNAL MEDICINE

## 2020-01-21 PROCEDURE — 99213 OFFICE O/P EST LOW 20 MIN: CPT | Performed by: INTERNAL MEDICINE

## 2020-01-21 PROCEDURE — 36415 COLL VENOUS BLD VENIPUNCTURE: CPT | Performed by: INTERNAL MEDICINE

## 2020-01-21 RX ORDER — GLIMEPIRIDE 2 MG/1
2 TABLET ORAL
Qty: 90 TABLET | Refills: 3 | Status: SHIPPED | OUTPATIENT
Start: 2020-01-21 | End: 2021-01-08

## 2020-01-21 ASSESSMENT — MIFFLIN-ST. JEOR: SCORE: 2620.01

## 2020-01-21 ASSESSMENT — PAIN SCALES - GENERAL: PAINLEVEL: NO PAIN (0)

## 2020-01-21 NOTE — LETTER
January 22, 2020      Edenilson Mccartney  [address] [place] [address]        Dear Edenilson, your recent test results are attached.   The chemistry panel demonstrates an elevated blood sugar of 255 with normal kidney function.   Liver tests are normal.   The hemoglobin A1c has returned at 10.6 which is minimally improved over the previous 11.5.     Please set up an appointment so we can discuss further blood sugar management and medications.     Feel free to contact me via the office or My Chart if you have any questions regarding the above.     Resulted Orders   Comprehensive metabolic panel (BMP + Alb, Alk Phos, ALT, AST, Total. Bili, TP)   Result Value Ref Range    Sodium 137 133 - 144 mmol/L    Potassium 4.8 3.4 - 5.3 mmol/L    Chloride 104 94 - 109 mmol/L    Carbon Dioxide 31 20 - 32 mmol/L    Anion Gap 2 (L) 3 - 14 mmol/L    Glucose 255 (H) 70 - 99 mg/dL    Urea Nitrogen 16 7 - 30 mg/dL    Creatinine 0.98 0.66 - 1.25 mg/dL    GFR Estimate >90 >60 mL/min/[1.73_m2]      Comment:      Non  GFR Calc  Starting 12/18/2018, serum creatinine based estimated GFR (eGFR) will be   calculated using the Chronic Kidney Disease Epidemiology Collaboration   (CKD-EPI) equation.      GFR Estimate If Black >90 >60 mL/min/[1.73_m2]      Comment:       GFR Calc  Starting 12/18/2018, serum creatinine based estimated GFR (eGFR) will be   calculated using the Chronic Kidney Disease Epidemiology Collaboration   (CKD-EPI) equation.      Calcium 8.8 8.5 - 10.1 mg/dL    Bilirubin Total 0.9 0.2 - 1.3 mg/dL    Albumin 3.8 3.4 - 5.0 g/dL    Protein Total 7.0 6.8 - 8.8 g/dL    Alkaline Phosphatase 57 40 - 150 U/L    ALT 74 (H) 0 - 70 U/L    AST 38 0 - 45 U/L   Hemoglobin A1c   Result Value Ref Range    Hemoglobin A1C 10.6 (H) 0 - 5.6 %      Comment:      Normal <5.7% Prediabetes 5.7-6.4%  Diabetes 6.5% or higher - adopted from ADA   consensus guidelines.

## 2020-01-21 NOTE — PROGRESS NOTES
Subjective     Edenilson Mccartney is a 34 year old male who presents to clinic today for the following health issues:    HPI   Diabetes Follow-up    How often are you checking your blood sugar? One time daily  What time of day are you checking your blood sugars (select all that apply)?  Before meals  Have you had any blood sugars above 200?  Yes   Have you had any blood sugars below 70?  No    What symptoms do you notice when your blood sugar is low?  None    What concerns do you have today about your diabetes? Blood sugar is often over 200     Do you have any of these symptoms? (Select all that apply)  No numbness or tingling in feet.  No redness, sores or blisters on feet.  No complaints of excessive thirst.  No reports of blurry vision.  No significant changes to weight.    Have you had a diabetic eye exam in the last 12 months? Yes- Date of last eye exam: September 2019        BP Readings from Last 2 Encounters:   01/21/20 134/88   03/22/19 (!) 142/98     Hemoglobin A1C (%)   Date Value   03/18/2019 11.5 (H)   10/27/2017 6.2 (H)     LDL Cholesterol Calculated (mg/dL)   Date Value   03/18/2019 100 (H)   02/08/2017 111 (H)           How many servings of fruits and vegetables do you eat daily?  2-3    On average, how many sweetened beverages do you drink each day (Examples: soda, juice, sweet tea, etc.  Do NOT count diet or artificially sweetened beverages)?   1    How many days per week do you exercise enough to make your heart beat faster? 3 or less    How many minutes a day do you exercise enough to make your heart beat faster? 60 or more    How many days per week do you miss taking your medication? 0      Chief complaint  The patient is a pleasant 34-year-old gentleman who has a history of type 2 diabetes.  He was recently started on Amaryl in addition to his metformin but due to some communication deficiency 5 that upon finishing  the prescription he would discontinue the medication.  Subsequently his blood sugar  has gone back up somewhat.  He notes that while on the medication, it dropped down to most values under 140.  He denies any symptoms of polyuria or polydipsia.  He is not having any visual changes or signs of acute dehydration.  He notes that he is watching his diet fairly well and does take his ARB and aspirin.  At this time, he is not on a statin but we have discussed initiating this in the future.                         PAST, FAMILY,SOCIAL HISTORY:     Medical  History:   has a past medical history of Diabetes (H), HTN, goal below 140/90 (8/5/2015), Hyperlipidemia LDL goal <100 (2/25/2015), Microalbuminuria (10/14/2015), Morbid obesity with BMI of 45.0-49.9, adult (H) (2/25/2015), Obesity (BMI 30-39.9) (8/5/2015), Other acne, Proteinuria (2/25/2015), Type 2 diabetes mellitus with complication, without long-term current use of insulin (H) (2/8/2017), and Type 2 diabetes, HbA1c goal < 7% (H) (2/25/2015).     Surgical History:   has no past surgical history on file.     Social History:   reports that he has never smoked. His smokeless tobacco use includes chew. He reports current alcohol use. He reports that he does not use drugs.     Family History:  family history is not on file.            MEDICATIONS  Current Outpatient Medications   Medication Sig Dispense Refill     glimepiride (AMARYL) 2 MG tablet Take 1 tablet (2 mg) by mouth every morning (before breakfast) 90 tablet 3     losartan (COZAAR) 25 MG tablet Take 1 tablet (25 mg) by mouth daily 90 tablet 3     metFORMIN (GLUCOPHAGE) 500 MG tablet Take 1 tablet (500 mg) by mouth 2 times daily (with meals) 180 tablet 3     ASPIRIN NOT PRESCRIBED, INTENTIONAL, Antiplatelet medication not prescribed intentionally due to Not indicated based on age       blood glucose (ACCU-CHEK SMARTVIEW) test strip Use to test blood sugar one time daily or as directed. 100 strip prn     blood glucose monitoring (ACCU-CHEK FASTCLIX) lancets Use to test blood sugar one time daily or  "as directed. 100 each 0     order for DME 15-20 mm mercury thigh high compression stockings 1-2 pairs 2 Package 5         --------------------------------------------------------------------------------------------------------------------                              Review of Systems       LUNGS: Pt denies: cough, excess sputum, hemoptysis, or shortness of breath.   HEART: Pt denies: chest pain, arrhythmia, syncope, tachy or bradyarrhythmia.   GI: Pt denies: nausea, vomiting, diarrhea, constipation, melena, or hematochezia.   NEURO: Pt denies: seizures, strokes, diplopia, weakness, paraesthesias, or paralysis.   SKIN: Pt denies: itching, rashes, discoloration, or specific lesions of concern. Denies recent hair loss.   PSYCH: The patient denies significant depression, anxiety, mood imbalance. Specifically denies any suicidal ideation.                                     Examination    /88   Pulse 98   Temp 98.1  F (36.7  C) (Temporal)   Resp 16   Ht 1.88 m (6' 2\")   Wt (!) 161.5 kg (356 lb 1.6 oz)   SpO2 97%   BMI 45.72 kg/m       Constitutional: The patient appears to be in no acute distress. The patient appears to be adequately hydrated. No acute respiratory or hemodynamic distress is noted at this time.   LUNGS: clear bilaterally, airflow is brisk, no intercostal retraction or stridor is noted. No coughing is noted during visit.   HEART:  regular without rubs, clicks, gallops, or murmurs. PMI is nondisplaced. Upstrokes are brisk. S1,S2 are heard.   GI: Abdomen is soft, without rebound, guarding or tenderness. Bowel sounds are appropriate. No renal bruits are heard.   NEURO: Pt is alert and appropriate. No neurologic lateralization is noted. Cranial nerves 2-12 are intact. Peripheral sensory and motor function are grossly normal.    SKIN:  warm and dry. No erythema, or rashes are noted. No specific lesions of concern are noted.    PSYCH: The patient appears grossly appropriate. Maintains good eye " contact, does not have any jittery or atypical motion. Displays appropriate affect.                                           Decision Making    1. Type 2 diabetes mellitus with complication, without long-term current use of insulin (H)  Restart Amaryl, check lab work including A1c and renal function.  - Comprehensive metabolic panel (BMP + Alb, Alk Phos, ALT, AST, Total. Bili, TP)  - Hemoglobin A1c  - metFORMIN (GLUCOPHAGE) 500 MG tablet; Take 1 tablet (500 mg) by mouth 2 times daily (with meals)  Dispense: 180 tablet; Refill: 3  - glimepiride (AMARYL) 2 MG tablet; Take 1 tablet (2 mg) by mouth every morning (before breakfast)  Dispense: 90 tablet; Refill: 3    2. Class 3 severe obesity due to excess calories without serious comorbidity with body mass index (BMI) of 40.0 to 44.9 in adult (H)  Recommend weight loss to responsible caloric restriction      3. HTN, goal below 140/90  Continue current medications including ARB.  Recommend weight loss  Salt restriction recommended                               FOLLOW UP   I have asked the patient to make an appointment for followup with me in 1 month.  Anticipate will need to increase medications once Amaryl has been started, would consider Actos for cost containment purposes.  May also optimize metformin to 1000 mg twice daily trying to avoid diarrhea.            I have carefully explained the diagnosis and treatment options to the patient.  The patient has displayed an understanding of the above, and all subsequent questions were answered.        DO IGOR Monique    Portions of this note were produced using [employer]  Although every attempt at real-time proof reading has been made, occasional grammar/syntax errors may have been missed.

## 2020-01-22 NOTE — RESULT ENCOUNTER NOTE
Dear Edenilson, your recent test results are attached.  The chemistry panel demonstrates an elevated blood sugar of 255 with normal kidney function.  Liver tests are normal.  The hemoglobin A1c has returned at 10.6 which is minimally improved over the previous 11.5.    Please set up an appointment so we can discuss further blood sugar management and medications.    Feel free to contact me via the office or My Chart if you have any questions regarding the above.

## 2020-03-21 DIAGNOSIS — I10 HTN, GOAL BELOW 140/90: ICD-10-CM

## 2020-03-23 RX ORDER — LOSARTAN POTASSIUM 25 MG/1
TABLET ORAL
Qty: 90 TABLET | Refills: 1 | Status: SHIPPED | OUTPATIENT
Start: 2020-03-23 | End: 2020-09-22

## 2020-03-23 NOTE — TELEPHONE ENCOUNTER
"Requested Prescriptions   Pending Prescriptions Disp Refills     losartan (COZAAR) 25 MG tablet [Pharmacy Med Name: LOSARTAN POTASSIUM 25MG TABS] 90 tablet 3     Sig: TAKE ONE TABLET BY MOUTH ONCE DAILY   Last Written Prescription Date:  03/18/2019  Last Fill Quantity: 90,  # refills: 3   Last office visit: 1/21/2020 with prescribing provider:  01/21/2020   Future Office Visit:        Angiotensin-II Receptors Passed - 3/21/2020 11:13 AM        Passed - Last blood pressure under 140/90 in past 12 months     BP Readings from Last 3 Encounters:   01/21/20 134/88   03/22/19 (!) 142/98   03/18/19 (!) 144/98                 Passed - Recent (12 mo) or future (30 days) visit within the authorizing provider's specialty     Patient has had an office visit with the authorizing provider or a provider within the authorizing providers department within the previous 12 mos or has a future within next 30 days. See \"Patient Info\" tab in inbasket, or \"Choose Columns\" in Meds & Orders section of the refill encounter.              Passed - Medication is active on med list        Passed - Patient is age 18 or older        Passed - Normal serum creatinine on file in past 12 months     Recent Labs   Lab Test 01/21/20  1025  01/17/18  0823   CR 0.98   < >  --    CREAT  --   --  0.8    < > = values in this interval not displayed.       Ok to refill medication if creatinine is low          Passed - Normal serum potassium on file in past 12 months     Recent Labs   Lab Test 01/21/20  1025   POTASSIUM 4.8                         "

## 2020-09-21 DIAGNOSIS — I10 HTN, GOAL BELOW 140/90: ICD-10-CM

## 2020-09-22 RX ORDER — LOSARTAN POTASSIUM 25 MG/1
TABLET ORAL
Qty: 90 TABLET | Refills: 1 | Status: SHIPPED | OUTPATIENT
Start: 2020-09-22 | End: 2021-02-09

## 2020-09-22 NOTE — TELEPHONE ENCOUNTER
Prescription approved per [facility] Refill Protocol.    LISA UrbinaN, RN  [facility]

## 2020-10-01 ENCOUNTER — TRANSFERRED RECORDS (OUTPATIENT)
Dept: HEALTH INFORMATION MANAGEMENT | Facility: CLINIC | Age: 35
End: 2020-10-01

## 2020-10-01 LAB — RETINOPATHY: NORMAL

## 2021-01-08 DIAGNOSIS — E11.8 TYPE 2 DIABETES MELLITUS WITH COMPLICATION, WITHOUT LONG-TERM CURRENT USE OF INSULIN (H): ICD-10-CM

## 2021-01-08 RX ORDER — GLIMEPIRIDE 2 MG/1
TABLET ORAL
Qty: 30 TABLET | Refills: 0 | Status: SHIPPED | OUTPATIENT
Start: 2021-01-08 | End: 2021-02-09

## 2021-01-08 NOTE — TELEPHONE ENCOUNTER
metFORMIN (GLUCOPHAGE) 500 MG tablet [Pharmacy Med Name: METFORMIN HCL 500MG TABS] 180 tablet 3    Sig: TAKE ONE TABLET BY MOUTH TWICE A DAY WITH MEALS   Routing refill request to provider for review/approval because:  Labs out of range:  A1C last results 1/21/2020 = 10.6  Labs not current:  A1C last taken 1/21/2020  T'd up 1 month for provider review.        glimepiride (AMARYL) 2 MG tablet [Pharmacy Med Name: GLIMEPIRIDE 2MG TABS] 90 tablet 3    Sig: TAKE ONE TABLET BY MOUTH EVERY MORNING BEFORE BREAKFAST   Routing refill request to provider for review/approval because:  Labs out of range:  A1C last results 1/21/2020 = 10.6  Labs not current:  A1C last taken 1/21/2020  T'd up 1 month for provider review.    Sending to scheduling for yearly office visit due    Franchesca Bhatt RN

## 2021-01-08 NOTE — TELEPHONE ENCOUNTER
"Requested Prescriptions   Pending Prescriptions Disp Refills     metFORMIN (GLUCOPHAGE) 500 MG tablet [Pharmacy Med Name: METFORMIN HCL 500MG TABS] 180 tablet 3     Sig: TAKE ONE TABLET BY MOUTH TWICE A DAY WITH MEALS   Last Written Prescription Date:  1/21/2020  Last Fill Quantity: 180,  # refills: 3   Last office visit: 1/21/2020 with prescribing provider:     Future Office Visit:        Biguanide Agents Failed - 1/8/2021 10:18 AM        Failed - Patient has documented A1c within the specified period of time.     If HgbA1C is 8 or greater, it needs to be on file within the past 3 months.  If less than 8, must be on file within the past 6 months.     Recent Labs   Lab Test 01/21/20  1025   A1C 10.6*             Failed - Recent (6 mo) or future (30 days) visit within the authorizing provider's specialty     Patient had office visit in the last 6 months or has a visit in the next 30 days with authorizing provider or within the authorizing provider's specialty.  See \"Patient Info\" tab in inbasket, or \"Choose Columns\" in Meds & Orders section of the refill encounter.            Passed - Patient is age 10 or older        Passed - Patient's CR is NOT>1.4 OR Patient's EGFR is NOT<45 within past 12 mos.     Recent Labs   Lab Test 01/21/20  1025   GFRESTIMATED >90   GFRESTBLACK >90       Recent Labs   Lab Test 01/21/20  1025   CR 0.98             Passed - Patient does NOT have a diagnosis of CHF.        Passed - Medication is active on med list           glimepiride (AMARYL) 2 MG tablet [Pharmacy Med Name: GLIMEPIRIDE 2MG TABS] 90 tablet 3     Sig: TAKE ONE TABLET BY MOUTH EVERY MORNING BEFORE BREAKFAST       Sulfonylurea Agents Failed - 1/8/2021 10:18 AM        Failed - Patient has documented A1c within the specified period of time.     If HgbA1C is 8 or greater, it needs to be on file within the past 3 months.  If less than 8, must be on file within the past 6 months.     Recent Labs   Lab Test 01/21/20  1025   A1C 10.6* " "            Failed - Recent (6 mo) or future (30 days) visit within the authorizing provider's specialty     Patient had office visit in the last 6 months or has a visit in the next 30 days with authorizing provider or within the authorizing provider's specialty.  See \"Patient Info\" tab in inbasket, or \"Choose Columns\" in Meds & Orders section of the refill encounter.            Passed - Medication is active on med list        Passed - Patient is age 18 or older        Passed - Patient has a recent creatinine (normal) within the past 12 mos.     Recent Labs   Lab Test 01/21/20  1025 01/17/18  0823 01/17/18  0823   CR 0.98   < >  --    CREAT  --   --  0.8    < > = values in this interval not displayed.       Ok to refill medication if creatinine is low           Routing refill requests to provider for review/approval   Franchesca Bhatt RN    "

## 2021-02-09 ENCOUNTER — OFFICE VISIT (OUTPATIENT)
Dept: INTERNAL MEDICINE | Facility: CLINIC | Age: 36
End: 2021-02-09
Payer: COMMERCIAL

## 2021-02-09 VITALS
TEMPERATURE: 98.1 F | HEART RATE: 100 BPM | HEIGHT: 74 IN | WEIGHT: 315 LBS | SYSTOLIC BLOOD PRESSURE: 136 MMHG | OXYGEN SATURATION: 96 % | BODY MASS INDEX: 40.43 KG/M2 | RESPIRATION RATE: 20 BRPM | DIASTOLIC BLOOD PRESSURE: 84 MMHG

## 2021-02-09 DIAGNOSIS — I10 HTN, GOAL BELOW 140/90: ICD-10-CM

## 2021-02-09 DIAGNOSIS — Z13.220 SCREENING FOR HYPERLIPIDEMIA: Primary | ICD-10-CM

## 2021-02-09 DIAGNOSIS — E11.8 TYPE 2 DIABETES MELLITUS WITH COMPLICATION, WITHOUT LONG-TERM CURRENT USE OF INSULIN (H): ICD-10-CM

## 2021-02-09 DIAGNOSIS — E66.01 MORBID OBESITY (H): ICD-10-CM

## 2021-02-09 LAB
ANION GAP SERPL CALCULATED.3IONS-SCNC: 5 MMOL/L (ref 3–14)
BUN SERPL-MCNC: 16 MG/DL (ref 7–30)
CALCIUM SERPL-MCNC: 9.5 MG/DL (ref 8.5–10.1)
CHLORIDE SERPL-SCNC: 104 MMOL/L (ref 94–109)
CHOLEST SERPL-MCNC: 220 MG/DL
CO2 SERPL-SCNC: 28 MMOL/L (ref 20–32)
CREAT SERPL-MCNC: 1.02 MG/DL (ref 0.66–1.25)
CREAT UR-MCNC: 114 MG/DL
GFR SERPL CREATININE-BSD FRML MDRD: >90 ML/MIN/{1.73_M2}
GLUCOSE SERPL-MCNC: 132 MG/DL (ref 70–99)
HBA1C MFR BLD: 7.6 % (ref 0–5.6)
HDLC SERPL-MCNC: 43 MG/DL
LDLC SERPL CALC-MCNC: 123 MG/DL
MICROALBUMIN UR-MCNC: 824 MG/L
MICROALBUMIN/CREAT UR: 722.81 MG/G CR (ref 0–17)
NONHDLC SERPL-MCNC: 177 MG/DL
POTASSIUM SERPL-SCNC: 4.3 MMOL/L (ref 3.4–5.3)
SODIUM SERPL-SCNC: 137 MMOL/L (ref 133–144)
TRIGL SERPL-MCNC: 271 MG/DL

## 2021-02-09 PROCEDURE — 83036 HEMOGLOBIN GLYCOSYLATED A1C: CPT | Performed by: INTERNAL MEDICINE

## 2021-02-09 PROCEDURE — 80048 BASIC METABOLIC PNL TOTAL CA: CPT | Performed by: INTERNAL MEDICINE

## 2021-02-09 PROCEDURE — 80061 LIPID PANEL: CPT | Performed by: INTERNAL MEDICINE

## 2021-02-09 PROCEDURE — 82043 UR ALBUMIN QUANTITATIVE: CPT | Performed by: INTERNAL MEDICINE

## 2021-02-09 PROCEDURE — 99214 OFFICE O/P EST MOD 30 MIN: CPT | Performed by: INTERNAL MEDICINE

## 2021-02-09 PROCEDURE — 36415 COLL VENOUS BLD VENIPUNCTURE: CPT | Performed by: INTERNAL MEDICINE

## 2021-02-09 RX ORDER — BLOOD SUGAR DIAGNOSTIC
STRIP MISCELLANEOUS
Qty: 100 STRIP | Status: SHIPPED | OUTPATIENT
Start: 2021-02-09 | End: 2023-02-03

## 2021-02-09 RX ORDER — GLIMEPIRIDE 2 MG/1
TABLET ORAL
Qty: 90 TABLET | Refills: 1 | Status: SHIPPED | OUTPATIENT
Start: 2021-02-09 | End: 2021-08-18

## 2021-02-09 RX ORDER — LOSARTAN POTASSIUM 25 MG/1
25 TABLET ORAL DAILY
Qty: 90 TABLET | Refills: 1 | Status: SHIPPED | OUTPATIENT
Start: 2021-02-09 | End: 2021-10-07

## 2021-02-09 RX ORDER — LANCETS
EACH MISCELLANEOUS
Qty: 100 EACH | Refills: 0 | Status: SHIPPED | OUTPATIENT
Start: 2021-02-09

## 2021-02-09 ASSESSMENT — MIFFLIN-ST. JEOR: SCORE: 2727.95

## 2021-02-09 ASSESSMENT — PAIN SCALES - GENERAL: PAINLEVEL: NO PAIN (0)

## 2021-02-09 NOTE — PROGRESS NOTES
Durga Pyle is a 35 year old who presents to clinic today for the following health issues     HPI       Diabetes Follow-up    How often are you checking your blood sugar? One time daily  What time of day are you checking your blood sugars (select all that apply)?  Before meals  Have you had any blood sugars above 200?  No  Have you had any blood sugars below 70?  No    What symptoms do you notice when your blood sugar is low?  None    What concerns do you have today about your diabetes? None     Do you have any of these symptoms? (Select all that apply)  No numbness or tingling in feet.  No redness, sores or blisters on feet.  No complaints of excessive thirst.  No reports of blurry vision.  No significant changes to weight.    Have you had a diabetic eye exam in the last 12 months? Yes- Date of last eye exam: 10/2020,  Location: [facility]        BP Readings from Last 2 Encounters:   02/09/21 136/84   01/21/20 134/88     Hemoglobin A1C (%)   Date Value   01/21/2020 10.6 (H)   03/18/2019 11.5 (H)     LDL Cholesterol Calculated (mg/dL)   Date Value   03/18/2019 100 (H)   02/08/2017 111 (H)          EMR reviewed including:             Complaint, History of Chief Complaint, Corresponding Review of Systems, and Complaint Specific Physical Examination.    #1   Follow-up on diabetes.  Compliant with medications.  Compliant with diet.  Most blood sugar checks less than 150.  190 was the worst he has seen in the last year.  Denies polyuria or polydipsia.  Denies symptoms of hypoglycemia.  Ocular examinations up-to-date.  Exam:  Heart regular without rubs, clicks or murmurs.  Lungs clear without rubs, rales or rhonchi.  No skin lesions or ulcerations noted.  No neuropathy seen.    #2   Follow-up of hypertension.  Taking losartan without difficulty.  Denies lightheadedness or near syncope.  Denies edema.  Exam:  No pedal edema noted.  No jugular venous distention noted.      Vital Signs:   /84 (BP  "Location: Right arm, Patient Position: Sitting, Cuff Size: Adult Large)   Pulse 100   Temp 98.1  F (36.7  C) (Temporal)   Resp 20   Ht 1.88 m (6' 2\")   Wt (!) 172.8 kg (381 lb)   SpO2 96%   BMI 48.92 kg/m               Decision Making    Problem and Complexity     1. Screening for hyperlipidemia  We will check lipid level.  Given his diabetes, strongly recommend the addition of statin.  Patient has been somewhat hesitant.  - Lipid panel reflex to direct LDL Non-fasting    2. Type 2 diabetes mellitus with complication, without long-term current use of insulin (H)  Check A1c, lipid levels and renal function.  - Lipid panel reflex to direct LDL Non-fasting  - Albumin Random Urine Quantitative with Creat Ratio  - HEMOGLOBIN A1C  - BASIC METABOLIC PANEL  - blood glucose (ACCU-CHEK SMARTVIEW) test strip; Use to test blood sugar one time daily or as directed.  Dispense: 100 strip; Refill: prn  - metFORMIN (GLUCOPHAGE) 500 MG tablet; Take 1 tablet (500 mg) by mouth 2 times daily (with meals)  Dispense: 90 tablet; Refill: 1  - glimepiride (AMARYL) 2 MG tablet; TAKE ONE TABLET BY MOUTH EVERY MORNING BEFORE BREAKFAST  Dispense: 90 tablet; Refill: 1  - blood glucose monitoring (ACCU-CHEK FASTCLIX) lancets; Use to test blood sugar one time daily or as directed.  Dispense: 100 each; Refill: 0    3. HTN, goal below 140/90  - Albumin Random Urine Quantitative with Creat Ratio  - BASIC METABOLIC PANEL  - losartan (COZAAR) 25 MG tablet; Take 1 tablet (25 mg) by mouth daily  Dispense: 90 tablet; Refill: 1    4. Morbid obesity (H)  Recommend weight loss through responsible caloric restriction and exercise       ------------------------------------------------------------------------------------------------------------------------------  Data  1  Specialty (external) notes reviewed:   None    Tests reviewed:   None     Tests ordered:   Lab work ordered    Independent Historians Interview:   None    2  Review of outside Tests:   " None    3   Verbal Discussion with Specialists:    None      ----------------------------------------------------------------------------------------------------------------------------------  Risk   Prescription drug management:   Yes                                High risk for toxicity:     Decision regarding surgery:    None     Social determinants of health:   No     Decision to withhold therapy:   None                               FOLLOW UP   I have asked the patient to make an appointment for followup with me as predicated by his lab results.  Otherwise in 6 months            I have carefully explained the diagnosis and treatment options to the patient.  The patient has displayed an understanding of the above, and all subsequent questions were answered.      DO IGOR Monique    Portions of this note were produced using [employer]  Although every attempt at real-time proof reading has been made, occasional grammar/syntax errors may have been missed.

## 2021-02-09 NOTE — LETTER
February 22, 2021      Edenilson BRUCE Sherrill  [address] [place] [address]          Dear Edenilson, your recent test results are attached.     Urine sample shows a significant amount of protein loss through the kidneys.  Has gone up slightly over the last couple years.   Your A1c has improved significantly and is now down from 10.6 last year to 7.6.  This is a marked improvement.   Your cholesterol is slightly elevated with LDL of 123.     Given your history of diabetes and increased risk for vascular complications, I would recommend starting statin therapy.  Medication such as Crestor and Lipitor have been shown to significantly decrease your risk of heart disease as time goes by.     Chemistry panel does show blood sugar only 132 and kidney function appears to be normal.       You will be contacted with any outstanding results as they are available.   Feel free to contact me via the office or My Chart if you have any questions regarding the above.     Resulted Orders   Lipid panel reflex to direct LDL Non-fasting   Result Value Ref Range    Cholesterol 220 (H) <200 mg/dL      Comment:      Desirable:       <200 mg/dl    Triglycerides 271 (H) <150 mg/dL      Comment:      Borderline high:  150-199 mg/dl  High:             200-499 mg/dl  Very high:       >499 mg/dl  Non Fasting      HDL Cholesterol 43 >39 mg/dL    LDL Cholesterol Calculated 123 (H) <100 mg/dL      Comment:      Above desirable:  100-129 mg/dl  Borderline High:  130-159 mg/dL  High:             160-189 mg/dL  Very high:       >189 mg/dl      Non HDL Cholesterol 177 (H) <130 mg/dL      Comment:      Above Desirable:  130-159 mg/dl  Borderline high:  160-189 mg/dl  High:             190-219 mg/dl  Very high:       >219 mg/dl     Albumin Random Urine Quantitative with Creat Ratio   Result Value Ref Range    Creatinine Urine 114 mg/dL    Albumin Urine mg/L 824 mg/L    Albumin Urine mg/g Cr 722.81 (H) 0 - 17 mg/g Cr   HEMOGLOBIN A1C   Result Value Ref Range     Hemoglobin A1C 7.6 (H) 0 - 5.6 %      Comment:      Normal <5.7% Prediabetes 5.7-6.4%  Diabetes 6.5% or higher - adopted from ADA   consensus guidelines.     BASIC METABOLIC PANEL   Result Value Ref Range    Sodium 137 133 - 144 mmol/L    Potassium 4.3 3.4 - 5.3 mmol/L    Chloride 104 94 - 109 mmol/L    Carbon Dioxide 28 20 - 32 mmol/L    Anion Gap 5 3 - 14 mmol/L    Glucose 132 (H) 70 - 99 mg/dL      Comment:      Non Fasting    Urea Nitrogen 16 7 - 30 mg/dL    Creatinine 1.02 0.66 - 1.25 mg/dL    GFR Estimate >90 >60 mL/min/[1.73_m2]      Comment:      Non  GFR Calc  Starting 12/18/2018, serum creatinine based estimated GFR (eGFR) will be   calculated using the Chronic Kidney Disease Epidemiology Collaboration   (CKD-EPI) equation.      GFR Estimate If Black >90 >60 mL/min/[1.73_m2]      Comment:       GFR Calc  Starting 12/18/2018, serum creatinine based estimated GFR (eGFR) will be   calculated using the Chronic Kidney Disease Epidemiology Collaboration   (CKD-EPI) equation.      Calcium 9.5 8.5 - 10.1 mg/dL       If you have any questions or concerns, please call the clinic at the number listed above.       Sincerely,      Hugh Duran DO

## 2021-05-17 DIAGNOSIS — E11.8 TYPE 2 DIABETES MELLITUS WITH COMPLICATION, WITHOUT LONG-TERM CURRENT USE OF INSULIN (H): ICD-10-CM

## 2021-08-16 DIAGNOSIS — E11.8 TYPE 2 DIABETES MELLITUS WITH COMPLICATION, WITHOUT LONG-TERM CURRENT USE OF INSULIN (H): ICD-10-CM

## 2021-08-18 RX ORDER — GLIMEPIRIDE 2 MG/1
TABLET ORAL
Qty: 30 TABLET | Refills: 0 | Status: SHIPPED | OUTPATIENT
Start: 2021-08-18 | End: 2021-09-17

## 2021-08-18 NOTE — TELEPHONE ENCOUNTER
"Routing refill request to provider for review/approval because:  Labs not current:  A1C  T'd up 1 month for provider review.    Requested Prescriptions   Pending Prescriptions Disp Refills     glimepiride (AMARYL) 2 MG tablet [Pharmacy Med Name: GLIMEPIRIDE 2MG TABS] 90 tablet 1     Sig: TAKE ONE TABLET BY MOUTH EVERY MORNING BEFORE BREAKFAST   Last Written Prescription Date:  2/9/2021  Last Fill Quantity: 90,  # refills: 1   Last office visit: 2/9/2021 with prescribing provider:     Future Office Visit:        Sulfonylurea Agents Failed - 8/16/2021 10:14 AM        Failed - Patient has documented A1c within the specified period of time.     If HgbA1C is 8 or greater, it needs to be on file within the past 3 months.  If less than 8, must be on file within the past 6 months.     Recent Labs   Lab Test 02/09/21  1100   A1C 7.6*             Failed - Recent (6 mo) or future (30 days) visit within the authorizing provider's specialty     Patient had office visit in the last 6 months or has a visit in the next 30 days with authorizing provider or within the authorizing provider's specialty.  See \"Patient Info\" tab in inbasket, or \"Choose Columns\" in Meds & Orders section of the refill encounter.            Passed - Medication is active on med list        Passed - Patient is age 18 or older        Passed - Patient has a recent creatinine (normal) within the past 12 mos.     Recent Labs   Lab Test 02/09/21  1100 01/17/18  0823   CR 1.02  --    CREAT  --  0.8       Ok to refill medication if creatinine is low           Franchesca Bhatt RN      "

## 2021-09-15 DIAGNOSIS — E11.8 TYPE 2 DIABETES MELLITUS WITH COMPLICATION, WITHOUT LONG-TERM CURRENT USE OF INSULIN (H): ICD-10-CM

## 2021-09-16 NOTE — TELEPHONE ENCOUNTER
Pending Prescriptions:                       Disp   Refills    glimepiride (AMARYL) 2 MG tablet [Pharmacy*30 tab*0        Sig: TAKE ONE TABLET BY MOUTH EVERY MORNING BEFORE           BREAKFAST      Routing refill request to provider for review/approval because:  Marsha given x1 and patient did not follow up, please advise    Tiera Verdugo RN on 9/16/2021 at 3:11 PM

## 2021-09-17 RX ORDER — GLIMEPIRIDE 2 MG/1
TABLET ORAL
Qty: 30 TABLET | Refills: 0 | Status: SHIPPED | OUTPATIENT
Start: 2021-09-17 | End: 2021-10-13

## 2021-10-06 DIAGNOSIS — I10 HTN, GOAL BELOW 140/90: ICD-10-CM

## 2021-10-07 RX ORDER — LOSARTAN POTASSIUM 25 MG/1
25 TABLET ORAL DAILY
Qty: 90 TABLET | Refills: 1 | Status: SHIPPED | OUTPATIENT
Start: 2021-10-07 | End: 2022-04-08

## 2021-10-12 DIAGNOSIS — E11.8 TYPE 2 DIABETES MELLITUS WITH COMPLICATION, WITHOUT LONG-TERM CURRENT USE OF INSULIN (H): ICD-10-CM

## 2021-10-13 RX ORDER — GLIMEPIRIDE 2 MG/1
TABLET ORAL
Qty: 30 TABLET | Refills: 0 | Status: SHIPPED | OUTPATIENT
Start: 2021-10-13 | End: 2021-11-19

## 2021-10-13 NOTE — TELEPHONE ENCOUNTER
Cyrus  Routing refill request to provider for review/approval because:  Labs not current:  A1C    Franchesca Bhatt RN

## 2021-11-19 DIAGNOSIS — E11.8 TYPE 2 DIABETES MELLITUS WITH COMPLICATION, WITHOUT LONG-TERM CURRENT USE OF INSULIN (H): ICD-10-CM

## 2021-11-19 RX ORDER — GLIMEPIRIDE 2 MG/1
TABLET ORAL
Qty: 30 TABLET | Refills: 0 | Status: SHIPPED | OUTPATIENT
Start: 2021-11-19 | End: 2021-12-21

## 2021-11-19 NOTE — TELEPHONE ENCOUNTER
Routing refill request to provider for review/approval because:  Labs not current:  A1C  Patient needs to be seen because:  diabetic follow up   Lab Results   Component Value Date    A1C 7.6 02/09/2021    A1C 10.6 01/21/2020    A1C 11.5 03/18/2019    A1C 6.2 10/27/2017    A1C 6.3 02/08/2017     Lisa Harrison RN, BSN

## 2021-12-20 DIAGNOSIS — E11.8 TYPE 2 DIABETES MELLITUS WITH COMPLICATION, WITHOUT LONG-TERM CURRENT USE OF INSULIN (H): ICD-10-CM

## 2021-12-21 RX ORDER — GLIMEPIRIDE 2 MG/1
TABLET ORAL
Qty: 30 TABLET | Refills: 0 | Status: SHIPPED | OUTPATIENT
Start: 2021-12-21 | End: 2022-01-19

## 2021-12-21 NOTE — TELEPHONE ENCOUNTER
Routing refill request to provider for review/approval because:  Labs not current:  A1c  Patient needs to be seen because:  6 month visit due      Kenneth Skinner RN

## 2022-01-17 DIAGNOSIS — E11.8 TYPE 2 DIABETES MELLITUS WITH COMPLICATION, WITHOUT LONG-TERM CURRENT USE OF INSULIN (H): ICD-10-CM

## 2022-01-19 RX ORDER — GLIMEPIRIDE 2 MG/1
TABLET ORAL
Qty: 30 TABLET | Refills: 0 | Status: SHIPPED | OUTPATIENT
Start: 2022-01-19 | End: 2022-02-15

## 2022-01-19 NOTE — TELEPHONE ENCOUNTER
Routing refill request to provider for review/approval because:  Labs not current:  A1C  Needs diabetic follow up appointment    Gina Sanchez Rn

## 2022-02-14 DIAGNOSIS — E11.8 TYPE 2 DIABETES MELLITUS WITH COMPLICATION, WITHOUT LONG-TERM CURRENT USE OF INSULIN (H): ICD-10-CM

## 2022-02-15 RX ORDER — GLIMEPIRIDE 2 MG/1
TABLET ORAL
Qty: 30 TABLET | Refills: 0 | Status: SHIPPED | OUTPATIENT
Start: 2022-02-15 | End: 2022-03-29

## 2022-02-15 NOTE — TELEPHONE ENCOUNTER
Pending Prescriptions:                       Disp   Refills    glimepiride (AMARYL) 2 MG tablet [Pharmacy*30 tab*0        Sig: TAKE ONE TABLET BY MOUTH EVERY MORNING BEFORE           BREAKFAST    Routing refill request to provider for review/approval because:  Labs not current:  A1C, CR  Patient needs to be seen because it has been more than 1 year since last office visit.    Sending to scheduling for yearly office visit due    Franchesca Bhatt RN

## 2022-03-28 DIAGNOSIS — E11.8 TYPE 2 DIABETES MELLITUS WITH COMPLICATION, WITHOUT LONG-TERM CURRENT USE OF INSULIN (H): ICD-10-CM

## 2022-03-29 RX ORDER — GLIMEPIRIDE 2 MG/1
TABLET ORAL
Qty: 30 TABLET | Refills: 0 | Status: SHIPPED | OUTPATIENT
Start: 2022-03-29 | End: 2022-04-27

## 2022-03-29 NOTE — TELEPHONE ENCOUNTER
Routing refill request to provider for review/approval because:  Marsha given x1 and patient did not follow up, please advise  Patient needs to be seen because it has been more than 1 year since last office visit.    LISA UrbinaN, RN  [facility]

## 2022-04-06 DIAGNOSIS — I10 HTN, GOAL BELOW 140/90: ICD-10-CM

## 2022-04-08 RX ORDER — LOSARTAN POTASSIUM 25 MG/1
25 TABLET ORAL DAILY
Qty: 90 TABLET | Refills: 1 | Status: SHIPPED | OUTPATIENT
Start: 2022-04-08 | End: 2022-07-07

## 2022-04-08 NOTE — TELEPHONE ENCOUNTER
Pending Prescriptions:                       Disp   Refills    losartan (COZAAR) 25 MG tablet [Pharmacy M*90 tab*1        Sig: TAKE 1 TABLET (25 MG) BY MOUTH DAILY    Routing refill request to provider for review/approval because:  Labs not current:    Potassium   Date Value Ref Range Status   02/09/2021 4.3 3.4 - 5.3 mmol/L Final     Creatinine   Date Value Ref Range Status   02/09/2021 1.02 0.66 - 1.25 mg/dL Final

## 2022-04-25 DIAGNOSIS — E11.8 TYPE 2 DIABETES MELLITUS WITH COMPLICATION, WITHOUT LONG-TERM CURRENT USE OF INSULIN (H): ICD-10-CM

## 2022-04-27 RX ORDER — GLIMEPIRIDE 2 MG/1
TABLET ORAL
Qty: 15 TABLET | Refills: 0 | Status: SHIPPED | OUTPATIENT
Start: 2022-04-27 | End: 2022-07-07

## 2022-04-27 NOTE — TELEPHONE ENCOUNTER
Pending Prescriptions:                       Disp   Refills    glimepiride (AMARYL) 2 MG tablet [Pharmacy*15 tab*0        Sig: TAKE ONE TABLET BY MOUTH EVERY MORNING BEFORE           BREAKFAST (NEED OFFICE VISIT FOR ANY FURTHER           REFILLS)    Routing refill request to provider for review/approval because:  Marsha given x1 and patient did not follow up, please advise

## 2022-05-25 DIAGNOSIS — E11.8 TYPE 2 DIABETES MELLITUS WITH COMPLICATION, WITHOUT LONG-TERM CURRENT USE OF INSULIN (H): ICD-10-CM

## 2022-05-27 NOTE — TELEPHONE ENCOUNTER
Pending Prescriptions:                       Disp   Refills    metFORMIN (GLUCOPHAGE) 500 MG tablet [Phar*180 ta*1        Sig: TAKE 1 TABLET (500 MG) BY MOUTH 2 TIMES DAILY WITH           MEALS    Routing refill request to provider for review/approval because:  Labs not current:  CR, A1C  Patient needs to be seen because it has been more than 1 year since last office visit.    Sending to scheduling for yearly office visit due    Franchesca Bhatt RN

## 2022-05-27 NOTE — TELEPHONE ENCOUNTER
Spoke with patient and informed of note below. Appointment scheduled for 06/09/2022  Vandana Neves MA

## 2022-06-16 ENCOUNTER — OFFICE VISIT (OUTPATIENT)
Dept: INTERNAL MEDICINE | Facility: CLINIC | Age: 37
End: 2022-06-16
Payer: COMMERCIAL

## 2022-06-16 VITALS
DIASTOLIC BLOOD PRESSURE: 94 MMHG | SYSTOLIC BLOOD PRESSURE: 146 MMHG | BODY MASS INDEX: 40.43 KG/M2 | HEIGHT: 74 IN | HEART RATE: 102 BPM | WEIGHT: 315 LBS | OXYGEN SATURATION: 96 % | TEMPERATURE: 98.5 F

## 2022-06-16 DIAGNOSIS — Z13.220 SCREENING FOR HYPERLIPIDEMIA: ICD-10-CM

## 2022-06-16 DIAGNOSIS — Z11.59 NEED FOR HEPATITIS C SCREENING TEST: ICD-10-CM

## 2022-06-16 DIAGNOSIS — E11.8 TYPE 2 DIABETES MELLITUS WITH COMPLICATION, WITHOUT LONG-TERM CURRENT USE OF INSULIN (H): Primary | ICD-10-CM

## 2022-06-16 DIAGNOSIS — Z11.4 SCREENING FOR HIV (HUMAN IMMUNODEFICIENCY VIRUS): ICD-10-CM

## 2022-06-16 LAB
ANION GAP SERPL CALCULATED.3IONS-SCNC: 3 MMOL/L (ref 3–14)
BUN SERPL-MCNC: 18 MG/DL (ref 7–30)
CALCIUM SERPL-MCNC: 9.3 MG/DL (ref 8.5–10.1)
CHLORIDE BLD-SCNC: 103 MMOL/L (ref 94–109)
CHOLEST SERPL-MCNC: 224 MG/DL
CO2 SERPL-SCNC: 30 MMOL/L (ref 20–32)
CREAT SERPL-MCNC: 1 MG/DL (ref 0.66–1.25)
CREAT UR-MCNC: 157 MG/DL
FASTING STATUS PATIENT QL REPORTED: YES
GFR SERPL CREATININE-BSD FRML MDRD: >90 ML/MIN/1.73M2
GLUCOSE BLD-MCNC: 246 MG/DL (ref 70–99)
HBA1C MFR BLD: 10.8 % (ref 0–5.6)
HDLC SERPL-MCNC: 45 MG/DL
LDLC SERPL CALC-MCNC: 117 MG/DL
MICROALBUMIN UR-MCNC: 1600 MG/L
MICROALBUMIN/CREAT UR: 1019.11 MG/G CR (ref 0–17)
NONHDLC SERPL-MCNC: 179 MG/DL
POTASSIUM BLD-SCNC: 3.9 MMOL/L (ref 3.4–5.3)
SODIUM SERPL-SCNC: 136 MMOL/L (ref 133–144)
TRIGL SERPL-MCNC: 311 MG/DL

## 2022-06-16 PROCEDURE — 86803 HEPATITIS C AB TEST: CPT | Performed by: INTERNAL MEDICINE

## 2022-06-16 PROCEDURE — 82043 UR ALBUMIN QUANTITATIVE: CPT | Performed by: INTERNAL MEDICINE

## 2022-06-16 PROCEDURE — 83036 HEMOGLOBIN GLYCOSYLATED A1C: CPT | Performed by: INTERNAL MEDICINE

## 2022-06-16 PROCEDURE — 80048 BASIC METABOLIC PNL TOTAL CA: CPT | Performed by: INTERNAL MEDICINE

## 2022-06-16 PROCEDURE — 99395 PREV VISIT EST AGE 18-39: CPT | Performed by: INTERNAL MEDICINE

## 2022-06-16 PROCEDURE — 80061 LIPID PANEL: CPT | Performed by: INTERNAL MEDICINE

## 2022-06-16 PROCEDURE — 87389 HIV-1 AG W/HIV-1&-2 AB AG IA: CPT | Performed by: INTERNAL MEDICINE

## 2022-06-16 PROCEDURE — 36415 COLL VENOUS BLD VENIPUNCTURE: CPT | Performed by: INTERNAL MEDICINE

## 2022-06-16 ASSESSMENT — PAIN SCALES - GENERAL: PAINLEVEL: NO PAIN (0)

## 2022-06-16 ASSESSMENT — ENCOUNTER SYMPTOMS
MYALGIAS: 0
DIZZINESS: 0
ABDOMINAL PAIN: 0
PARESTHESIAS: 0
HEMATOCHEZIA: 0
NAUSEA: 0
SHORTNESS OF BREATH: 0
HEMATURIA: 0
FEVER: 0
DIARRHEA: 0
WEAKNESS: 0
FREQUENCY: 0
COUGH: 0
SORE THROAT: 0
ARTHRALGIAS: 0
PALPITATIONS: 0
EYE PAIN: 0
DYSURIA: 0
JOINT SWELLING: 0
HEARTBURN: 0
NERVOUS/ANXIOUS: 0
CHILLS: 0
HEADACHES: 0
CONSTIPATION: 0

## 2022-06-17 LAB
HCV AB SERPL QL IA: NONREACTIVE
HIV 1+2 AB+HIV1 P24 AG SERPL QL IA: NONREACTIVE

## 2022-07-07 ENCOUNTER — OFFICE VISIT (OUTPATIENT)
Dept: INTERNAL MEDICINE | Facility: CLINIC | Age: 37
End: 2022-07-07
Payer: COMMERCIAL

## 2022-07-07 VITALS
HEIGHT: 74 IN | RESPIRATION RATE: 16 BRPM | BODY MASS INDEX: 40.43 KG/M2 | HEART RATE: 107 BPM | OXYGEN SATURATION: 97 % | TEMPERATURE: 97.8 F | SYSTOLIC BLOOD PRESSURE: 128 MMHG | DIASTOLIC BLOOD PRESSURE: 88 MMHG | WEIGHT: 315 LBS

## 2022-07-07 DIAGNOSIS — E11.8 TYPE 2 DIABETES MELLITUS WITH COMPLICATION, WITHOUT LONG-TERM CURRENT USE OF INSULIN (H): ICD-10-CM

## 2022-07-07 DIAGNOSIS — E66.01 MORBID OBESITY (H): ICD-10-CM

## 2022-07-07 DIAGNOSIS — I10 HTN, GOAL BELOW 140/90: ICD-10-CM

## 2022-07-07 PROCEDURE — 99214 OFFICE O/P EST MOD 30 MIN: CPT | Performed by: INTERNAL MEDICINE

## 2022-07-07 PROCEDURE — 99207 PR FOOT EXAM NO CHARGE: CPT | Performed by: INTERNAL MEDICINE

## 2022-07-07 RX ORDER — LOSARTAN POTASSIUM 50 MG/1
50 TABLET ORAL DAILY
Qty: 30 TABLET | Refills: 0 | Status: SHIPPED | OUTPATIENT
Start: 2022-07-07 | End: 2022-08-12

## 2022-07-07 RX ORDER — LOSARTAN POTASSIUM 25 MG/1
25 TABLET ORAL DAILY
Qty: 90 TABLET | Refills: 3 | Status: SHIPPED | OUTPATIENT
Start: 2022-07-07 | End: 2022-07-07

## 2022-07-07 RX ORDER — GLIMEPIRIDE 4 MG/1
TABLET ORAL
Qty: 90 TABLET | Refills: 3 | Status: SHIPPED | OUTPATIENT
Start: 2022-07-07 | End: 2023-07-20

## 2022-07-07 ASSESSMENT — PAIN SCALES - GENERAL: PAINLEVEL: NO PAIN (0)

## 2022-07-07 NOTE — PROGRESS NOTES
Durga Pyle is a 37 year old, presenting for the following health issues:  Diabetes (Test results from visit on 06/16/2022)      History of Present Illness       Diabetes:   He presents for follow up of diabetes.  He is checking home blood glucose a few times a week. He checks blood glucose before meals and at bedtime.  Blood glucose is sometimes over 200 and never under 70. When his blood glucose is low, the patient is asymptomatic for confusion, blurred vision, lethargy and reports not feeling dizzy, shaky, or weak.  He has no concerns regarding his diabetes at this time.  He is not experiencing numbness or burning in feet, excessive thirst, blurry vision, weight changes or redness, sores or blisters on feet.         He eats 2-3 servings of fruits and vegetables daily.He consumes 1 sweetened beverage(s) daily.He exercises with enough effort to increase his heart rate 60 or more minutes per day.  He exercises with enough effort to increase his heart rate 5 days per week. He is missing 1 dose(s) of medications per week.          EMR reviewed including:             Complaint, History of Chief Complaint, Corresponding Review of Systems, and Complaint Specific Physical Examination.    #1   Follow-up on type 2 diabetes  Has been reportedly lax in his dietary restriction.  Last A1c was 10.8 on June 16  Currently taking metformin, Amaryl.  Also on ARB.  Denies polyuria or polydipsia.  Denies symptoms of hypoglycemia.        Exam:   LUNGS: clear bilaterally, airflow is brisk, no intercostal retraction or stridor is noted. No coughing is noted during visit.   HEART:  regular without rubs, clicks, gallops, or murmurs. PMI is nondisplaced. Upstrokes are brisk. S1,S2 are heard.   GI: Abdomen is soft, without rebound, guarding or tenderness. Bowel sounds are appropriate. No renal bruits are heard.   NEURO: Pt is alert and appropriate. No neurologic lateralization is noted. Cranial nerves 2-12 are intact.  "Peripheral sensory and motor function are grossly normal.    Feet: no evidence of skin breakdown or ulceration is noted.  Sensation is intact to monofilament and vibration.  Pulses are strong, capillary refill is brisk.        Patient has been interviewed, applicable history and applied review of systems have been performed.    Vital Signs:   /88 (BP Location: Right arm)   Pulse 107   Temp 97.8  F (36.6  C) (Temporal)   Resp 16   Ht 1.88 m (6' 2\")   Wt (!) 163.5 kg (360 lb 8 oz)   SpO2 97%   BMI 46.29 kg/m        Decision Making    Problem and Complexity     1. Type 2 diabetes mellitus with complication, without long-term current use of insulin (H)  Recommend increase dietary compliance.  Will check A1c in a couple months.  Discussed diabetic education.   Patient not interested at this time  Last .  Recommended statin therapy.   Patient does not wish any more medications at this time.    - glimepiride (AMARYL) 4 MG tablet; TAKE ONE TABLET BY MOUTH EVERY MORNING BEFORE BREAKFAST (NEED OFFICE VISIT FOR ANY FURTHER REFILLS)  Dispense: 90 tablet; Refill: 3  - metFORMIN (GLUCOPHAGE) 500 MG tablet; TAKE 1 TABLET (500 MG) BY MOUTH 2 TIMES DAILY WITH MEALS  Dispense: 180 tablet; Refill: 3  - FOOT EXAM    2. HTN, goal below 140/90  Currently controlled blood pressure of 128/88.  Continue ARB    3. Morbid obesity (H)  Recommend weight loss responsible caloric restriction and exercise as tolerated                                FOLLOW UP   I have asked the patient to make an appointment for followup with either:  1.  Me,  2.  The patient's preferred provider,  3.  Any available provider  In 2 months for lab work including A1c and lipids.  If not markedly improved, would recommend adding GLP-1 agonist and statin.        Regarding routine vaccinations:  I have reviewed the patient's vaccination schedule and discussed the benefits of prophylactic vaccination in detail.  I recommend the patient contact their " pharmacist (not the pharmacy within the clinic) for vaccinations.  Discussed that most insurance companies now favor reimbursement to the pharmacies and it will financially behoove the patient to have vaccinations performed at their pharmacy.        I have carefully explained the diagnosis and treatment options to the patient.  The patient has displayed an understanding of the above, and all subsequent questions were answered.      DO IGOR Monique    Portions of this note were produced using [employer]  Although every attempt at real-time proof reading has been made, occasional grammar/syntax errors may have been missed.

## 2022-08-09 DIAGNOSIS — I10 HTN, GOAL BELOW 140/90: ICD-10-CM

## 2022-08-09 DIAGNOSIS — E11.8 TYPE 2 DIABETES MELLITUS WITH COMPLICATION, WITHOUT LONG-TERM CURRENT USE OF INSULIN (H): ICD-10-CM

## 2022-08-12 RX ORDER — LOSARTAN POTASSIUM 50 MG/1
TABLET ORAL
Qty: 90 TABLET | Refills: 3 | Status: SHIPPED | OUTPATIENT
Start: 2022-08-12 | End: 2023-09-05

## 2022-10-09 ENCOUNTER — HEALTH MAINTENANCE LETTER (OUTPATIENT)
Age: 37
End: 2022-10-09

## 2023-01-20 ENCOUNTER — TRANSFERRED RECORDS (OUTPATIENT)
Dept: HEALTH INFORMATION MANAGEMENT | Facility: CLINIC | Age: 38
End: 2023-01-20
Payer: COMMERCIAL

## 2023-01-20 LAB — RETINOPATHY: NEGATIVE

## 2023-02-03 ENCOUNTER — OFFICE VISIT (OUTPATIENT)
Dept: INTERNAL MEDICINE | Facility: CLINIC | Age: 38
End: 2023-02-03
Payer: COMMERCIAL

## 2023-02-03 VITALS
HEIGHT: 74 IN | OXYGEN SATURATION: 99 % | WEIGHT: 315 LBS | DIASTOLIC BLOOD PRESSURE: 92 MMHG | TEMPERATURE: 98 F | HEART RATE: 91 BPM | BODY MASS INDEX: 40.43 KG/M2 | SYSTOLIC BLOOD PRESSURE: 126 MMHG

## 2023-02-03 DIAGNOSIS — I10 HTN, GOAL BELOW 140/90: ICD-10-CM

## 2023-02-03 DIAGNOSIS — E11.8 TYPE 2 DIABETES MELLITUS WITH COMPLICATION, WITHOUT LONG-TERM CURRENT USE OF INSULIN (H): Primary | ICD-10-CM

## 2023-02-03 DIAGNOSIS — E66.01 MORBID OBESITY (H): ICD-10-CM

## 2023-02-03 LAB
ALBUMIN SERPL BCG-MCNC: 4.2 G/DL (ref 3.5–5.2)
ALP SERPL-CCNC: 63 U/L (ref 40–129)
ALT SERPL W P-5'-P-CCNC: 49 U/L (ref 10–50)
ANION GAP SERPL CALCULATED.3IONS-SCNC: 10 MMOL/L (ref 7–15)
AST SERPL W P-5'-P-CCNC: 25 U/L (ref 10–50)
BILIRUB SERPL-MCNC: 0.8 MG/DL
BUN SERPL-MCNC: 13.2 MG/DL (ref 6–20)
CALCIUM SERPL-MCNC: 9.4 MG/DL (ref 8.6–10)
CHLORIDE SERPL-SCNC: 95 MMOL/L (ref 98–107)
CREAT SERPL-MCNC: 0.92 MG/DL (ref 0.67–1.17)
DEPRECATED HCO3 PLAS-SCNC: 27 MMOL/L (ref 22–29)
GFR SERPL CREATININE-BSD FRML MDRD: >90 ML/MIN/1.73M2
GLUCOSE SERPL-MCNC: 283 MG/DL (ref 70–99)
HBA1C MFR BLD: 10.1 %
POTASSIUM SERPL-SCNC: 4 MMOL/L (ref 3.4–5.3)
PROT SERPL-MCNC: 7 G/DL (ref 6.4–8.3)
SODIUM SERPL-SCNC: 132 MMOL/L (ref 136–145)

## 2023-02-03 PROCEDURE — 99214 OFFICE O/P EST MOD 30 MIN: CPT | Performed by: INTERNAL MEDICINE

## 2023-02-03 PROCEDURE — 36415 COLL VENOUS BLD VENIPUNCTURE: CPT | Performed by: INTERNAL MEDICINE

## 2023-02-03 PROCEDURE — 80053 COMPREHEN METABOLIC PANEL: CPT | Performed by: INTERNAL MEDICINE

## 2023-02-03 PROCEDURE — 83036 HEMOGLOBIN GLYCOSYLATED A1C: CPT | Performed by: INTERNAL MEDICINE

## 2023-02-03 RX ORDER — BLOOD SUGAR DIAGNOSTIC
STRIP MISCELLANEOUS
Qty: 100 STRIP | Refills: 11 | Status: SHIPPED | OUTPATIENT
Start: 2023-02-03

## 2023-02-03 NOTE — PROGRESS NOTES
Durga Pyle is a 37 year old, presenting for the following health issues:  Diabetes (Diabetic check)      History of Present Illness       Diabetes:   He presents for follow up of diabetes.  He is checking home blood glucose a few times a week. He checks blood glucose before meals and at bedtime.  Blood glucose is never over 200 and never under 70. When his blood glucose is low, the patient is asymptomatic for confusion, blurred vision, lethargy and reports not feeling dizzy, shaky, or weak.  He has no concerns regarding his diabetes at this time.  He is not experiencing numbness or burning in feet, excessive thirst, blurry vision, weight changes or redness, sores or blisters on feet.         He eats 2-3 servings of fruits and vegetables daily.He consumes 0 sweetened beverage(s) daily.He exercises with enough effort to increase his heart rate 30 to 60 minutes per day.  He exercises with enough effort to increase his heart rate 5 days per week. He is missing 1 dose(s) of medications per week.  He is not taking prescribed medications regularly due to remembering to take.        EMR reviewed including:             Complaint, History of Chief Complaint, Corresponding Review of Systems, and Complaint Specific Physical Examination.    #1   Follow-up on type 2 diabetes  Taking medication compliantly  Denies polyuria or polydipsia  Denies neuropathy  Has had no symptoms of hypoglycemia  Denies diarrhea or symptoms associated with medication        Exam:   LUNGS: clear bilaterally, airflow is brisk, no intercostal retraction or stridor is noted. No coughing is noted during visit.   HEART:  regular without rubs, clicks, gallops, or murmurs. PMI is nondisplaced. Upstrokes are brisk. S1,S2 are heard.   GI: Abdomen is soft, without rebound, guarding or tenderness. Bowel sounds are appropriate. No renal bruits are heard.  Abdomen is obese.   NEURO: Pt is alert and appropriate. No neurologic lateralization is noted.  "Cranial nerves 2-12 are intact. Peripheral sensory and motor function are grossly normal.       #2   Hypertension  Currently on 50 mg losartan daily.  Renal function historically stable.        Exam:  As above        Patient has been interviewed, applicable history and applied review of systems have been performed.    Vital Signs:   BP (!) 126/92   Pulse 91   Temp 98  F (36.7  C) (Tympanic)   Ht 1.885 m (6' 2.2\")   Wt (!) 167.4 kg (369 lb 1.6 oz)   SpO2 99%   BMI 47.13 kg/m        Decision Making    Problem and Complexity     1. Type 2 diabetes mellitus with complication, without long-term current use of insulin (H)  Check A1c, renal function.  Continue current medication and adjust dosages accordingly.  If A1c is elevated, would recommend the addition of GLP-1 agonist.  - HEMOGLOBIN A1C; Future  - Comprehensive metabolic panel (BMP + Alb, Alk Phos, ALT, AST, Total. Bili, TP); Future  - blood glucose (ACCU-CHEK SMARTVIEW) test strip; Use to test blood sugar one time daily or as directed.  Dispense: 100 strip; Refill: 11  - HEMOGLOBIN A1C  - Comprehensive metabolic panel (BMP + Alb, Alk Phos, ALT, AST, Total. Bili, TP)    2. Morbid obesity (H)  It is responsible caloric restriction.  May benefit from GLP-1 agonist.    3. HTN, goal below 140/90  Currently controlled.  Continue current medication.                                FOLLOW UP   I have asked the patient to make an appointment for followup with either:  1.  Me,  2.  The patient's preferred provider,  3.  Any available provider  In 4 months or as needed        Regarding routine vaccinations:  I have reviewed the patient's vaccination schedule and discussed the benefits of prophylactic vaccination in detail.  I recommend the patient contact their pharmacist (not the pharmacy within the clinic) for vaccinations.  Discussed that most insurance companies now favor reimbursement to the pharmacies and it will financially behoove the patient to have " vaccinations performed at their pharmacy.        I have carefully explained the diagnosis and treatment options to the patient.  The patient has displayed an understanding of the above, and all subsequent questions were answered.      DO IGOR Monique    Portions of this note were produced using [employer]  Although every attempt at real-time proof reading has been made, occasional grammar/syntax errors may have been missed.

## 2023-04-22 ENCOUNTER — MYC MEDICAL ADVICE (OUTPATIENT)
Dept: INTERNAL MEDICINE | Facility: CLINIC | Age: 38
End: 2023-04-22
Payer: COMMERCIAL

## 2023-05-17 ENCOUNTER — PATIENT OUTREACH (OUTPATIENT)
Dept: CARE COORDINATION | Facility: CLINIC | Age: 38
End: 2023-05-17
Payer: COMMERCIAL

## 2023-06-01 ENCOUNTER — PATIENT OUTREACH (OUTPATIENT)
Dept: CARE COORDINATION | Facility: CLINIC | Age: 38
End: 2023-06-01
Payer: COMMERCIAL

## 2023-07-19 DIAGNOSIS — E11.8 TYPE 2 DIABETES MELLITUS WITH COMPLICATION, WITHOUT LONG-TERM CURRENT USE OF INSULIN (H): ICD-10-CM

## 2023-07-20 RX ORDER — GLIMEPIRIDE 4 MG/1
TABLET ORAL
Qty: 90 TABLET | Refills: 3 | Status: SHIPPED | OUTPATIENT
Start: 2023-07-20

## 2023-08-19 ENCOUNTER — HEALTH MAINTENANCE LETTER (OUTPATIENT)
Age: 38
End: 2023-08-19

## 2023-09-04 DIAGNOSIS — I10 HTN, GOAL BELOW 140/90: ICD-10-CM

## 2023-09-04 DIAGNOSIS — E11.8 TYPE 2 DIABETES MELLITUS WITH COMPLICATION, WITHOUT LONG-TERM CURRENT USE OF INSULIN (H): ICD-10-CM

## 2023-09-05 RX ORDER — LOSARTAN POTASSIUM 50 MG/1
TABLET ORAL
Qty: 90 TABLET | Refills: 3 | Status: SHIPPED | OUTPATIENT
Start: 2023-09-05 | End: 2024-10-03

## 2023-09-22 ENCOUNTER — OFFICE VISIT (OUTPATIENT)
Dept: INTERNAL MEDICINE | Facility: CLINIC | Age: 38
End: 2023-09-22
Payer: COMMERCIAL

## 2023-09-22 VITALS
BODY MASS INDEX: 47.5 KG/M2 | DIASTOLIC BLOOD PRESSURE: 80 MMHG | RESPIRATION RATE: 14 BRPM | OXYGEN SATURATION: 98 % | HEART RATE: 80 BPM | TEMPERATURE: 98.8 F | WEIGHT: 315 LBS | SYSTOLIC BLOOD PRESSURE: 128 MMHG

## 2023-09-22 DIAGNOSIS — E66.813 CLASS 3 SEVERE OBESITY DUE TO EXCESS CALORIES WITHOUT SERIOUS COMORBIDITY WITH BODY MASS INDEX (BMI) OF 40.0 TO 44.9 IN ADULT (H): ICD-10-CM

## 2023-09-22 DIAGNOSIS — E66.01 CLASS 3 SEVERE OBESITY DUE TO EXCESS CALORIES WITHOUT SERIOUS COMORBIDITY WITH BODY MASS INDEX (BMI) OF 40.0 TO 44.9 IN ADULT (H): ICD-10-CM

## 2023-09-22 DIAGNOSIS — E78.5 HYPERLIPIDEMIA LDL GOAL <100: ICD-10-CM

## 2023-09-22 DIAGNOSIS — E11.8 TYPE 2 DIABETES MELLITUS WITH COMPLICATION, WITHOUT LONG-TERM CURRENT USE OF INSULIN (H): Primary | ICD-10-CM

## 2023-09-22 DIAGNOSIS — I10 HTN, GOAL BELOW 140/90: ICD-10-CM

## 2023-09-22 DIAGNOSIS — E11.8 TYPE 2 DIABETES MELLITUS WITH COMPLICATION, WITHOUT LONG-TERM CURRENT USE OF INSULIN (H): ICD-10-CM

## 2023-09-22 LAB
CHOLEST SERPL-MCNC: 212 MG/DL
CREAT UR-MCNC: 51 MG/DL
HBA1C MFR BLD: 10.3 %
HDLC SERPL-MCNC: 43 MG/DL
LDLC SERPL CALC-MCNC: 104 MG/DL
MICROALBUMIN UR-MCNC: 233.1 MG/L
MICROALBUMIN/CREAT UR: 457.06 MG/G CR (ref 0–17)
NONHDLC SERPL-MCNC: 169 MG/DL
TRIGL SERPL-MCNC: 323 MG/DL

## 2023-09-22 PROCEDURE — 83036 HEMOGLOBIN GLYCOSYLATED A1C: CPT | Performed by: INTERNAL MEDICINE

## 2023-09-22 PROCEDURE — 80061 LIPID PANEL: CPT | Performed by: INTERNAL MEDICINE

## 2023-09-22 PROCEDURE — 82043 UR ALBUMIN QUANTITATIVE: CPT | Performed by: INTERNAL MEDICINE

## 2023-09-22 PROCEDURE — 82570 ASSAY OF URINE CREATININE: CPT | Performed by: INTERNAL MEDICINE

## 2023-09-22 PROCEDURE — 36415 COLL VENOUS BLD VENIPUNCTURE: CPT | Performed by: INTERNAL MEDICINE

## 2023-09-22 PROCEDURE — 99214 OFFICE O/P EST MOD 30 MIN: CPT | Performed by: INTERNAL MEDICINE

## 2023-09-22 NOTE — PROGRESS NOTES
Durga Pyle is a 38 year old, presenting for the following health issues:  No chief complaint on file.      History of Present Illness       Diabetes:   He presents for follow up of diabetes.  He is checking home blood glucose one time daily.   He checks blood glucose before and after meals.  Blood glucose is sometimes over 200 and never under 70. He is aware of hypoglycemia symptoms including none.   He is concerned about blood sugar frequently over 200.    He is not experiencing numbness or burning in feet, excessive thirst, blurry vision, weight changes or redness, sores or blisters on feet.           He eats 0-1 servings of fruits and vegetables daily.He consumes 0 sweetened beverage(s) daily.He exercises with enough effort to increase his heart rate 60 or more minutes per day.  He exercises with enough effort to increase his heart rate 6 days per week. He is missing 1 dose(s) of medications per week.          EMR reviewed including:             Complaint, History of Chief Complaint, Corresponding Review of Systems, and Complaint Specific Physical Examination.    #1   Follow-up on type 2 diabetes  Patient has blood sugars occasionally over 200.  Denies hypoglycemia.  Currently on Jardiance, glimepiride and metformin.  Is taking aspirin, ARB without statin at this time.  Last LDL was 117.  Patient denies any polyuria or polydipsia.  Denies vision changes.  Denies symptoms of hypoglycemia.        Exam:   LUNGS: clear bilaterally, airflow is brisk, no intercostal retraction or stridor is noted. No coughing is noted during visit.   HEART:  regular without rubs, clicks, gallops, or murmurs. PMI is nondisplaced. Upstrokes are brisk. S1,S2 are heard.   GI: Abdomen is soft, without rebound, guarding or tenderness. Bowel sounds are appropriate. No renal bruits are heard.   NEURO: Pt is alert and appropriate. No neurologic lateralization is noted. Cranial nerves 2-12 are intact. Peripheral sensory and motor  function are grossly normal.       #2   Follow-up of hypertension  Her blood pressure 120/80.  Continues losartan.  No side effects with the medication.  Denies headaches, arrhythmia, or near syncope.        Exam:  As above        Patient has been interviewed, applicable history and applied review of systems have been performed.    Vital Signs:   /80   Pulse 80   Temp 98.8  F (37.1  C)   Resp 14   Wt (!) 168.7 kg (372 lb)   SpO2 98%   BMI 47.50 kg/m        Decision Making    Problem and Complexity     1. Type 2 diabetes mellitus with complication, without long-term current use of insulin (H)  Check lab work including A1c and lipids.  Adjust medications accordingly.  - Lipid panel reflex to direct LDL Non-fasting; Future  - Albumin Random Urine Quantitative with Creat Ratio; Future  - HEMOGLOBIN A1C; Future  - metFORMIN (GLUCOPHAGE) 500 MG tablet; TAKE 1 TABLET (500 MG) BY MOUTH 2 TIMES DAILY WITH MEALS  Dispense: 180 tablet; Refill: 3  - Lipid panel reflex to direct LDL Non-fasting  - Albumin Random Urine Quantitative with Creat Ratio  - HEMOGLOBIN A1C    2. HTN, goal below 140/90  Currently controlled.  Continue medication    3. Hyperlipidemia LDL goal <100  Currently not on statin.  Will discuss the addition of this.  Patient has been hesitant but if lipid levels returned elevated, would once again suggest    4. Class 3 severe obesity due to excess calories without serious comorbidity with body mass index (BMI) of 40.0 to 44.9 in adult (H)  Recommend weight loss responsible caloric restriction and exercise.                                FOLLOW UP   I have asked the patient to make an appointment for followup with with me in 6 months or as predicated by lab results    Regarding routine vaccinations:  I have reviewed the patient's vaccination schedule and discussed the benefits of prophylactic vaccination in detail.  I recommend the patient contact their pharmacist for vaccinations.  Discussed that most  insurance companies now favor reimbursement to the pharmacies and it will financially behoove the patient to have vaccinations performed at their pharmacy.        I have carefully explained the diagnosis and treatment options to the patient.  The patient has displayed an understanding of the above, and all subsequent questions were answered.      DO IGOR Monique    Portions of this note were produced using [employer]  Although every attempt at real-time proof reading has been made, occasional grammar/syntax errors may have been missed.

## 2023-09-22 NOTE — TELEPHONE ENCOUNTER
Order for jardiance on insurance is over $500 for 30 days---- would you like to change to another medication --- thank you Stella cornell,pharmacy technican

## 2023-09-25 ENCOUNTER — MYC MEDICAL ADVICE (OUTPATIENT)
Dept: INTERNAL MEDICINE | Facility: CLINIC | Age: 38
End: 2023-09-25
Payer: COMMERCIAL

## 2023-09-25 DIAGNOSIS — E11.8 TYPE 2 DIABETES MELLITUS WITH COMPLICATION, WITHOUT LONG-TERM CURRENT USE OF INSULIN (H): Primary | ICD-10-CM

## 2023-09-26 NOTE — TELEPHONE ENCOUNTER
Pt is wondering if provider has heard anything from the pharmacy regarding the price of the Jardiance and possibly trying something cheaper.

## 2023-09-27 RX ORDER — DAPAGLIFLOZIN 5 MG/1
5 TABLET, FILM COATED ORAL DAILY
Qty: 30 TABLET | Refills: 0 | Status: SHIPPED | OUTPATIENT
Start: 2023-09-27

## 2023-12-18 ENCOUNTER — APPOINTMENT (OUTPATIENT)
Dept: GENERAL RADIOLOGY | Facility: CLINIC | Age: 38
End: 2023-12-18
Attending: STUDENT IN AN ORGANIZED HEALTH CARE EDUCATION/TRAINING PROGRAM
Payer: COMMERCIAL

## 2023-12-18 ENCOUNTER — HOSPITAL ENCOUNTER (EMERGENCY)
Facility: CLINIC | Age: 38
Discharge: HOME | End: 2023-12-18
Attending: STUDENT IN AN ORGANIZED HEALTH CARE EDUCATION/TRAINING PROGRAM | Admitting: STUDENT IN AN ORGANIZED HEALTH CARE EDUCATION/TRAINING PROGRAM
Payer: COMMERCIAL

## 2023-12-18 VITALS
HEIGHT: 74 IN | BODY MASS INDEX: 47.76 KG/M2 | RESPIRATION RATE: 20 BRPM | TEMPERATURE: 100.5 F | DIASTOLIC BLOOD PRESSURE: 44 MMHG | OXYGEN SATURATION: 94 % | SYSTOLIC BLOOD PRESSURE: 125 MMHG | HEART RATE: 87 BPM

## 2023-12-18 DIAGNOSIS — J10.1 INFLUENZA A: Primary | ICD-10-CM

## 2023-12-18 DIAGNOSIS — R50.9 FEVER IN ADULT: ICD-10-CM

## 2023-12-18 DIAGNOSIS — R05.1 ACUTE COUGH: ICD-10-CM

## 2023-12-18 LAB
DEPRECATED S PYO AG THROAT QL EIA: NEGATIVE
FLUAV RNA SPEC QL NAA+PROBE: POSITIVE
FLUBV RNA RESP QL NAA+PROBE: NEGATIVE
GROUP A STREP BY PCR: NOT DETECTED
RSV RNA SPEC NAA+PROBE: NEGATIVE
SARS-COV-2 RNA RESP QL NAA+PROBE: NEGATIVE

## 2023-12-18 PROCEDURE — 99284 EMERGENCY DEPT VISIT MOD MDM: CPT | Performed by: STUDENT IN AN ORGANIZED HEALTH CARE EDUCATION/TRAINING PROGRAM

## 2023-12-18 PROCEDURE — 87651 STREP A DNA AMP PROBE: CPT | Performed by: STUDENT IN AN ORGANIZED HEALTH CARE EDUCATION/TRAINING PROGRAM

## 2023-12-18 PROCEDURE — 71045 X-RAY EXAM CHEST 1 VIEW: CPT

## 2023-12-18 PROCEDURE — 87637 SARSCOV2&INF A&B&RSV AMP PRB: CPT | Performed by: STUDENT IN AN ORGANIZED HEALTH CARE EDUCATION/TRAINING PROGRAM

## 2023-12-18 PROCEDURE — 96361 HYDRATE IV INFUSION ADD-ON: CPT | Performed by: STUDENT IN AN ORGANIZED HEALTH CARE EDUCATION/TRAINING PROGRAM

## 2023-12-18 PROCEDURE — 96374 THER/PROPH/DIAG INJ IV PUSH: CPT | Performed by: STUDENT IN AN ORGANIZED HEALTH CARE EDUCATION/TRAINING PROGRAM

## 2023-12-18 PROCEDURE — 96375 TX/PRO/DX INJ NEW DRUG ADDON: CPT | Performed by: STUDENT IN AN ORGANIZED HEALTH CARE EDUCATION/TRAINING PROGRAM

## 2023-12-18 PROCEDURE — 99284 EMERGENCY DEPT VISIT MOD MDM: CPT | Mod: 25 | Performed by: STUDENT IN AN ORGANIZED HEALTH CARE EDUCATION/TRAINING PROGRAM

## 2023-12-18 PROCEDURE — 258N000003 HC RX IP 258 OP 636: Performed by: STUDENT IN AN ORGANIZED HEALTH CARE EDUCATION/TRAINING PROGRAM

## 2023-12-18 PROCEDURE — 250N000011 HC RX IP 250 OP 636: Performed by: STUDENT IN AN ORGANIZED HEALTH CARE EDUCATION/TRAINING PROGRAM

## 2023-12-18 RX ORDER — ONDANSETRON 2 MG/ML
4 INJECTION INTRAMUSCULAR; INTRAVENOUS ONCE
Status: COMPLETED | OUTPATIENT
Start: 2023-12-18 | End: 2023-12-18

## 2023-12-18 RX ORDER — KETOROLAC TROMETHAMINE 15 MG/ML
15 INJECTION, SOLUTION INTRAMUSCULAR; INTRAVENOUS ONCE
Status: COMPLETED | OUTPATIENT
Start: 2023-12-18 | End: 2023-12-18

## 2023-12-18 RX ORDER — OSELTAMIVIR PHOSPHATE 75 MG/1
75 CAPSULE ORAL ONCE
Status: COMPLETED | OUTPATIENT
Start: 2023-12-18 | End: 2023-12-18

## 2023-12-18 RX ADMIN — KETOROLAC TROMETHAMINE 15 MG: 15 INJECTION, SOLUTION INTRAMUSCULAR; INTRAVENOUS at 03:52

## 2023-12-18 RX ADMIN — SODIUM CHLORIDE 1000 ML: 9 INJECTION, SOLUTION INTRAVENOUS at 03:46

## 2023-12-18 RX ADMIN — ONDANSETRON 4 MG: 2 INJECTION INTRAMUSCULAR; INTRAVENOUS at 03:50

## 2023-12-18 ASSESSMENT — ENCOUNTER SYMPTOMS
APPETITE CHANGE: 1
NAUSEA: 0
CHILLS: 1
DIARRHEA: 0
FEVER: 1
VOMITING: 0
COUGH: 1
HEADACHES: 1

## 2023-12-18 NOTE — ED TRIAGE NOTES
"Pt reports flu symptoms started Friday and \"I thought I would be better by now\". Pt c/o cough, fever, body aches, nausea, and diarrhea. Pt states he vomited yesterday, last dose of tylenol was yesterday at 1800.      Triage Assessment (Adult)       Row Name 12/18/23 0323          Triage Assessment    Airway WDL WDL        Respiratory WDL    Respiratory WDL cough        Cardiac WDL    Cardiac WDL WDL                     "

## 2023-12-18 NOTE — DISCHARGE INSTRUCTIONS
You are provided with Tamiflu please take this for other 4 days can start that tomorrow morning.  You received your first dose here in the ED.  Please see instructions for any further information about influenza.  He was provided with a work note otherwise to follow-up with your occupational health for any further recommendations for return to work.  Please maintain good hydration if any signs of shortness of breath please return for reevaluation.

## 2023-12-18 NOTE — ED PROVIDER NOTES
History     Chief Complaint   Patient presents with    Flu Symptoms     HPI  Edenilson Mccartney is a 38 year old male presenting with nasal congestion headache mild cough and generalized bodyaches and chills.  He has a mild temperature to 100.5 on arrival without tachycardia hypoxia or hypotension.  He notes that symptoms been going on for 3 days and has not significantly improved was hoping they would by this point.  He has no rashes shortness of breath chest pain abdominal pain nausea vomiting or diarrhea.  He notes that his son was sick approximately 5 days ago with similar symptoms.  He is up-to-date on his vaccinations.    Allergies:  Allergies   Allergen Reactions    No Known Drug Allergy        Problem List:    Patient Active Problem List    Diagnosis Date Noted    Morbid obesity (H) 01/21/2020     Priority: Medium    Obesity 03/22/2019     Priority: Medium    Varicose vein of leg 11/28/2017     Priority: Medium    Spleen hematoma, subsequent encounter 11/28/2017     Priority: Medium    Spleen hematoma, initial encounter 11/25/2017     Priority: Medium    Type 2 diabetes mellitus with complication, without long-term current use of insulin (H) 02/08/2017     Priority: Medium    Microalbuminuria 10/14/2015     Priority: Medium    HTN, goal below 140/90 08/05/2015     Priority: Medium    Proteinuria 02/25/2015     Priority: Medium    Hyperlipidemia LDL goal <100 02/25/2015     Priority: Medium        Past Medical History:    Past Medical History:   Diagnosis Date    Diabetes (H)     HTN, goal below 140/90 8/5/2015    Hyperlipidemia LDL goal <100 2/25/2015    Microalbuminuria 10/14/2015    Morbid obesity with BMI of 45.0-49.9, adult (H) 2/25/2015    Obesity (BMI 30-39.9) 8/5/2015    Other acne     Proteinuria 2/25/2015    Type 2 diabetes mellitus with complication, without long-term current use of insulin (H) 2/8/2017    Type 2 diabetes, HbA1c goal < 7% (H) 2/25/2015       Past Surgical History:    History  "reviewed. No pertinent surgical history.    Family History:    History reviewed. No pertinent family history.    Social History:  Marital Status:   [2]  Social History     Tobacco Use    Smoking status: Never    Smokeless tobacco: Current     Types: Chew   Vaping Use    Vaping Use: Never used   Substance Use Topics    Alcohol use: Yes     Alcohol/week: 0.0 standard drinks of alcohol     Comment: occasional    Drug use: No        Medications:    ASPIRIN NOT PRESCRIBED, INTENTIONAL,  blood glucose (ACCU-CHEK SMARTVIEW) test strip  blood glucose monitoring (ACCU-CHEK FASTCLIX) lancets  canagliflozin (INVOKANA) 100 MG tablet  dapagliflozin (FARXIGA) 5 MG TABS tablet  empagliflozin (JARDIANCE) 10 MG TABS tablet  glimepiride (AMARYL) 4 MG tablet  losartan (COZAAR) 50 MG tablet  metFORMIN (GLUCOPHAGE) 500 MG tablet  order for DME          Review of Systems   Constitutional:  Positive for appetite change, chills and fever.   Respiratory:  Positive for cough.    Gastrointestinal:  Negative for diarrhea, nausea and vomiting.   Skin:  Negative for rash.   Neurological:  Positive for headaches.   All other systems reviewed and are negative.      Physical Exam   BP: 139/82  Pulse: 89  Temp: (!) 100.5  F (38.1  C)  Resp: 20  Height: 188 cm (6' 2\")  SpO2: 94 %      Physical Exam  Vitals and nursing note reviewed.   Constitutional:       General: He is not in acute distress.     Appearance: He is obese. He is not ill-appearing, toxic-appearing or diaphoretic.   HENT:      Head: Normocephalic and atraumatic.      Nose: Congestion present.      Mouth/Throat:      Mouth: Mucous membranes are moist.   Cardiovascular:      Rate and Rhythm: Normal rate.      Pulses: Normal pulses.      Heart sounds: No murmur heard.  Pulmonary:      Effort: Pulmonary effort is normal.      Breath sounds: Normal breath sounds. No wheezing or rales.   Abdominal:      General: Abdomen is flat.      Palpations: Abdomen is soft.   Skin:     General: " Skin is warm.      Capillary Refill: Capillary refill takes less than 2 seconds.      Findings: No rash.   Neurological:      General: No focal deficit present.      Mental Status: He is alert and oriented to person, place, and time.   Psychiatric:         Mood and Affect: Mood normal.         ED Course                 Procedures                  Results for orders placed or performed during the hospital encounter of 12/18/23 (from the past 24 hour(s))   Symptomatic Influenza A/B, RSV, & SARS-CoV2 PCR (COVID-19) Nose    Specimen: Nose; Swab   Result Value Ref Range    Influenza A PCR Positive (A) Negative    Influenza B PCR Negative Negative    RSV PCR Negative Negative    SARS CoV2 PCR Negative Negative    Narrative    Testing was performed using the Xpert Xpress CoV2/Flu/RSV Assay on the Cepheid GeneXpert Instrument. This test should be ordered for the detection of SARS-CoV-2, influenza, and RSV viruses in individuals who meet clinical and/or epidemiological criteria. Test performance is unknown in asymptomatic patients. This test is for in vitro diagnostic use under the FDA EUA for laboratories certified under CLIA to perform high or moderate complexity testing. This test has not been FDA cleared or approved. A negative result does not rule out the presence of PCR inhibitors in the specimen or target RNA in concentration below the limit of detection for the assay. If only one viral target is positive but coinfection with multiple targets is suspected, the sample should be re-tested with another FDA cleared, approved, or authorized test, if coinfection would change clinical management. This test was validated by the [facility]. These laboratories are certified under the Clinical Laboratory Improvement Amendments of 1988 (CLIA-88) as qualified to perform high complexity laboratory testing.   Streptococcus A Rapid Screen w/Reflex to PCR    Specimen: Throat; Swab   Result Value Ref Range    Group  A Strep antigen Negative Negative   XR Chest Port 1 View    Narrative    EXAM: XR CHEST PORT 1 VIEW  LOCATION: [facility]  DATE: 12/18/2023    INDICATION: Fever+cough  COMPARISON: None.      Impression    IMPRESSION: Cardiomediastinal silhouette within normal limits. No focal consolidation or significant effusion.       Medications   sodium chloride 0.9% BOLUS 1,000 mL (0 mLs Intravenous Stopped 12/18/23 0427)   ketorolac (TORADOL) injection 15 mg (15 mg Intravenous $Given 12/18/23 0352)   ondansetron (ZOFRAN) injection 4 mg (4 mg Intravenous $Given 12/18/23 0350)   oseltamivir (TAMIFLU) capsule 75 mg (75 mg Oral Not Given 12/18/23 0443)       Assessments & Plan (with Medical Decision Making)     I have reviewed the nursing notes.    I have reviewed the findings, diagnosis, plan and need for follow up with the patient.        Medical Decision Making  38-year-old male presenting with 3-day history of nasal congestion cough and mild headache.  He said some intermittent fevers not resolving over the weekend.  At this time low likelihood of sinusitis ACS, CHF or COPD as patient does not smoke.  He has a history of asthma.  Concern for URI.  Patient positive for influenza A.  Offered Tamiflu patient refused.  Discussed return to work and provided patient with work note.  Information and supportive care measures discussed regarding management of influenza.  Outpatient follow-up and return precautions discussed.  Patient understands recommendations and discharged home                New Prescriptions    No medications on file       Final diagnoses:   Acute cough   Fever in adult   Influenza A       12/18/2023   [facility] EMERGENCY DEPT       Julio César Mueller MD  12/18/23 6426

## 2023-12-18 NOTE — Clinical Note
Edenilson Mccartney was seen and treated in our emergency department on 12/18/2023.  He may return to work on 12/21/2023.       If you have any questions or concerns, please don't hesitate to call.      Julio César Mueller MD

## 2024-01-04 ENCOUNTER — MYC REFILL (OUTPATIENT)
Dept: INTERNAL MEDICINE | Facility: CLINIC | Age: 39
End: 2024-01-04
Payer: COMMERCIAL

## 2024-01-04 DIAGNOSIS — E11.8 TYPE 2 DIABETES MELLITUS WITH COMPLICATION, WITHOUT LONG-TERM CURRENT USE OF INSULIN (H): ICD-10-CM

## 2024-01-26 ENCOUNTER — TRANSFERRED RECORDS (OUTPATIENT)
Dept: HEALTH INFORMATION MANAGEMENT | Facility: CLINIC | Age: 39
End: 2024-01-26
Payer: COMMERCIAL

## 2024-01-26 LAB — RETINOPATHY: NEGATIVE

## 2024-05-25 ENCOUNTER — HEALTH MAINTENANCE LETTER (OUTPATIENT)
Age: 39
End: 2024-05-25

## 2024-06-18 ENCOUNTER — MYC REFILL (OUTPATIENT)
Dept: INTERNAL MEDICINE | Facility: CLINIC | Age: 39
End: 2024-06-18
Payer: COMMERCIAL

## 2024-06-18 DIAGNOSIS — E11.8 TYPE 2 DIABETES MELLITUS WITH COMPLICATION, WITHOUT LONG-TERM CURRENT USE OF INSULIN (H): ICD-10-CM

## 2024-06-18 DIAGNOSIS — I10 HTN, GOAL BELOW 140/90: ICD-10-CM

## 2024-06-18 RX ORDER — LOSARTAN POTASSIUM 50 MG/1
50 TABLET ORAL DAILY
Qty: 90 TABLET | Refills: 3 | OUTPATIENT
Start: 2024-06-18

## 2024-09-18 ENCOUNTER — TELEPHONE (OUTPATIENT)
Dept: INTERNAL MEDICINE | Facility: CLINIC | Age: 39
End: 2024-09-18

## 2024-09-18 NOTE — TELEPHONE ENCOUNTER
Patient Quality Outreach    Patient is due for the following:   Diabetes -  A1C and Foot Exam  Physical Preventive Adult Physical    Next Steps:       Type of outreach:    Sent [employer] message.    Next Steps:  Reach out within 90 days via [employer].    Max number of attempts reached: No. Will try again in 90 days if patient still on fail list.    Questions for provider review:    None           Sarah Paredes LPN

## 2024-09-18 NOTE — LETTER
March 25, 2025    To  Edenilson Mccartney  [id number]  [place] [address]        Your team at [facility] cares about your health. We have reviewed your chart and based on our findings; we are making the following recommendations to better manage your health.     You are in particular need of attention regarding the following:     PREVENTATIVE VISIT: Physical    If you have already completed these items, please contact the clinic via phone or   MyChart so your care team can review and update your records. Thank you for   choosing [facility] for your healthcare needs. For any questions,   concerns, or to schedule an appointment please contact our clinic.    Healthy Regards,      Your [facility] Care Team

## 2024-10-02 DIAGNOSIS — E11.8 TYPE 2 DIABETES MELLITUS WITH COMPLICATION, WITHOUT LONG-TERM CURRENT USE OF INSULIN (H): ICD-10-CM

## 2024-10-02 DIAGNOSIS — I10 HTN, GOAL BELOW 140/90: ICD-10-CM

## 2024-10-03 RX ORDER — LOSARTAN POTASSIUM 50 MG/1
TABLET ORAL
Qty: 90 TABLET | Refills: 3 | Status: SHIPPED | OUTPATIENT
Start: 2024-10-03

## 2024-10-09 ENCOUNTER — OFFICE VISIT (OUTPATIENT)
Dept: INTERNAL MEDICINE | Facility: CLINIC | Age: 39
End: 2024-10-09
Payer: COMMERCIAL

## 2024-10-09 VITALS
SYSTOLIC BLOOD PRESSURE: 122 MMHG | RESPIRATION RATE: 18 BRPM | HEIGHT: 74 IN | HEART RATE: 76 BPM | TEMPERATURE: 98.2 F | DIASTOLIC BLOOD PRESSURE: 74 MMHG | BODY MASS INDEX: 40.43 KG/M2 | OXYGEN SATURATION: 97 % | WEIGHT: 315 LBS

## 2024-10-09 DIAGNOSIS — E66.01 CLASS 3 SEVERE OBESITY DUE TO EXCESS CALORIES WITHOUT SERIOUS COMORBIDITY WITH BODY MASS INDEX (BMI) OF 40.0 TO 44.9 IN ADULT (H): ICD-10-CM

## 2024-10-09 DIAGNOSIS — E11.8 TYPE 2 DIABETES MELLITUS WITH COMPLICATION, WITHOUT LONG-TERM CURRENT USE OF INSULIN (H): ICD-10-CM

## 2024-10-09 DIAGNOSIS — E66.813 CLASS 3 SEVERE OBESITY DUE TO EXCESS CALORIES WITHOUT SERIOUS COMORBIDITY WITH BODY MASS INDEX (BMI) OF 40.0 TO 44.9 IN ADULT (H): ICD-10-CM

## 2024-10-09 DIAGNOSIS — I10 HTN, GOAL BELOW 140/90: Primary | ICD-10-CM

## 2024-10-09 LAB
ALBUMIN SERPL BCG-MCNC: 4.2 G/DL (ref 3.5–5.2)
ALP SERPL-CCNC: 73 U/L (ref 40–150)
ALT SERPL W P-5'-P-CCNC: 34 U/L (ref 0–70)
ANION GAP SERPL CALCULATED.3IONS-SCNC: 13 MMOL/L (ref 7–15)
AST SERPL W P-5'-P-CCNC: 19 U/L (ref 0–45)
BILIRUB SERPL-MCNC: 0.6 MG/DL
BUN SERPL-MCNC: 17.6 MG/DL (ref 6–20)
CALCIUM SERPL-MCNC: 9.6 MG/DL (ref 8.8–10.4)
CHLORIDE SERPL-SCNC: 98 MMOL/L (ref 98–107)
CHOLEST SERPL-MCNC: 217 MG/DL
CREAT SERPL-MCNC: 1.01 MG/DL (ref 0.67–1.17)
CREAT UR-MCNC: 73.3 MG/DL
EGFRCR SERPLBLD CKD-EPI 2021: >90 ML/MIN/1.73M2
ERYTHROCYTE [DISTWIDTH] IN BLOOD BY AUTOMATED COUNT: 12.8 % (ref 10–15)
EST. AVERAGE GLUCOSE BLD GHB EST-MCNC: 295 MG/DL
FASTING STATUS PATIENT QL REPORTED: YES
FASTING STATUS PATIENT QL REPORTED: YES
GLUCOSE SERPL-MCNC: 334 MG/DL (ref 70–99)
HBA1C MFR BLD: 11.9 %
HCO3 SERPL-SCNC: 24 MMOL/L (ref 22–29)
HCT VFR BLD AUTO: 44.7 % (ref 40–53)
HDLC SERPL-MCNC: 38 MG/DL
HGB BLD-MCNC: 15.3 G/DL (ref 13.3–17.7)
LDLC SERPL CALC-MCNC: 132 MG/DL
MCH RBC QN AUTO: 29.1 PG (ref 26.5–33)
MCHC RBC AUTO-ENTMCNC: 34.2 G/DL (ref 31.5–36.5)
MCV RBC AUTO: 85 FL (ref 78–100)
MICROALBUMIN UR-MCNC: 327.1 MG/L
MICROALBUMIN/CREAT UR: 446.25 MG/G CR (ref 0–17)
NONHDLC SERPL-MCNC: 179 MG/DL
PLATELET # BLD AUTO: 289 10E3/UL (ref 150–450)
POTASSIUM SERPL-SCNC: 4.5 MMOL/L (ref 3.4–5.3)
PROT SERPL-MCNC: 7.1 G/DL (ref 6.4–8.3)
RBC # BLD AUTO: 5.26 10E6/UL (ref 4.4–5.9)
SODIUM SERPL-SCNC: 135 MMOL/L (ref 135–145)
TRIGL SERPL-MCNC: 235 MG/DL
WBC # BLD AUTO: 7 10E3/UL (ref 4–11)

## 2024-10-09 PROCEDURE — 83036 HEMOGLOBIN GLYCOSYLATED A1C: CPT | Performed by: INTERNAL MEDICINE

## 2024-10-09 PROCEDURE — 99214 OFFICE O/P EST MOD 30 MIN: CPT | Performed by: INTERNAL MEDICINE

## 2024-10-09 PROCEDURE — 36415 COLL VENOUS BLD VENIPUNCTURE: CPT | Performed by: INTERNAL MEDICINE

## 2024-10-09 PROCEDURE — 80053 COMPREHEN METABOLIC PANEL: CPT | Performed by: INTERNAL MEDICINE

## 2024-10-09 PROCEDURE — G2211 COMPLEX E/M VISIT ADD ON: HCPCS | Performed by: INTERNAL MEDICINE

## 2024-10-09 PROCEDURE — 82570 ASSAY OF URINE CREATININE: CPT | Performed by: INTERNAL MEDICINE

## 2024-10-09 PROCEDURE — 80061 LIPID PANEL: CPT | Performed by: INTERNAL MEDICINE

## 2024-10-09 PROCEDURE — 82043 UR ALBUMIN QUANTITATIVE: CPT | Performed by: INTERNAL MEDICINE

## 2024-10-09 PROCEDURE — 85027 COMPLETE CBC AUTOMATED: CPT | Performed by: INTERNAL MEDICINE

## 2024-10-09 RX ORDER — GLIMEPIRIDE 4 MG/1
TABLET ORAL
Qty: 90 TABLET | Refills: 3 | Status: SHIPPED | OUTPATIENT
Start: 2024-10-09

## 2024-10-09 ASSESSMENT — PAIN SCALES - GENERAL: PAINLEVEL: NO PAIN (0)

## 2024-10-12 ENCOUNTER — HEALTH MAINTENANCE LETTER (OUTPATIENT)
Age: 39
End: 2024-10-12

## 2024-11-08 ENCOUNTER — MYC MEDICAL ADVICE (OUTPATIENT)
Dept: INTERNAL MEDICINE | Facility: CLINIC | Age: 39
End: 2024-11-08
Payer: COMMERCIAL

## 2024-11-08 NOTE — TELEPHONE ENCOUNTER
RN Triage    Patient Contact    Attempt # 1    Was call answered?  No.  Left message on voicemail with information to call me back. and MyChart.     Jaya Slaughter RN on 11/8/2024 at 9:54 AM

## 2024-11-11 ENCOUNTER — TELEPHONE (OUTPATIENT)
Dept: INTERNAL MEDICINE | Facility: CLINIC | Age: 39
End: 2024-11-11
Payer: COMMERCIAL

## 2024-11-11 DIAGNOSIS — E11.8 TYPE 2 DIABETES MELLITUS WITH COMPLICATION, WITHOUT LONG-TERM CURRENT USE OF INSULIN (H): ICD-10-CM

## 2024-11-11 NOTE — TELEPHONE ENCOUNTER
S-(situation): Phoned patient back after My Chart message sent (as copied below).  Patient verbalized that Dr. Renee MD wanted him to update him on his morning BS readings after the start of Jardiance recently.  Patient verbalized no symptoms at this time.    B-(background): Patient stated his BS numbers prior to adding the Jardiance to his Diabetes prescription regimen, his blood sugar readings were over 300 in the morning.    A-(assessment): Medication adjustments    R-(recommendations): Will forward to PCP for requested update.    Franchesca Bhatt RN      My chart message  Edenilson WORRELL [facility] (supporting Hugh Duran DO)3 days ago     Good morning, my blood sugar number when I check in the morning has been 240 since starting the jardiance.

## 2024-11-12 NOTE — TELEPHONE ENCOUNTER
Attempted contacting patient, no answer, left a message asking for a return call.    Erin Alvarado, VF

## 2024-11-12 NOTE — TELEPHONE ENCOUNTER
Patient returning call. Relayed providers update. Verbalized understanding.     He will schedule via [employer].     Jaya Slaughter RN on 11/12/2024 at 2:31 PM     Terbinafine Counseling: Patient counseling regarding adverse effects of terbinafine including but not limited to headache, diarrhea, rash, upset stomach, liver function test abnormalities, itching, taste/smell disturbance, nausea, abdominal pain, and flatulence.  There is a rare possibility of liver failure that can occur when taking terbinafine.  The patient understands that a baseline LFT and kidney function test may be required. The patient verbalized understanding of the proper use and possible adverse effects of terbinafine.  All of the patient's questions and concerns were addressed.

## 2024-11-12 NOTE — TELEPHONE ENCOUNTER
Please ask the patient to increase his Jardiance to 20 mg daily (2 of the 10 mg tablets until they are gone), then start 25 mg daily (prescription has been sent to his pharmacy already).    I would like to see the patient and his blood sugar log in about a month or so.    Renee

## 2024-12-09 ENCOUNTER — MYC MEDICAL ADVICE (OUTPATIENT)
Dept: INTERNAL MEDICINE | Facility: CLINIC | Age: 39
End: 2024-12-09
Payer: COMMERCIAL

## 2024-12-09 ENCOUNTER — MYC REFILL (OUTPATIENT)
Dept: INTERNAL MEDICINE | Facility: CLINIC | Age: 39
End: 2024-12-09
Payer: COMMERCIAL

## 2024-12-09 DIAGNOSIS — E11.8 TYPE 2 DIABETES MELLITUS WITH COMPLICATION, WITHOUT LONG-TERM CURRENT USE OF INSULIN (H): ICD-10-CM

## 2025-02-11 NOTE — TELEPHONE ENCOUNTER
Patient Quality Outreach    Patient is due for the following:   Physical Preventive Adult Physical      Topic Date Due    Hepatitis B Vaccine (1 of 3 - 19+ 3-dose series) Never done    Pneumococcal Vaccine (2 of 2 - PCV) 02/28/2018    Flu Vaccine (1) 09/01/2024    COVID-19 Vaccine (1 - 2024-25 season) Never done       Action(s) Taken:       Type of outreach:    Sent [employer] message.    Questions for provider review:    None           Sarah Paredes LPN

## 2025-02-14 ENCOUNTER — TRANSFERRED RECORDS (OUTPATIENT)
Dept: HEALTH INFORMATION MANAGEMENT | Facility: CLINIC | Age: 40
End: 2025-02-14
Payer: COMMERCIAL

## 2025-02-14 LAB — RETINOPATHY: NEGATIVE

## 2025-03-25 NOTE — TELEPHONE ENCOUNTER
Patient Quality Outreach    Patient is due for the following:   Physical Preventive Adult Physical      Topic Date Due    Hepatitis B Vaccine (1 of 3 - 19+ 3-dose series) Never done    Pneumococcal Vaccine (2 of 2 - PCV) 02/28/2018    Flu Vaccine (1) 09/01/2024    COVID-19 Vaccine (1 - 2024-25 season) Never done       Action(s) Taken:       Type of outreach:    Sent letter.    Questions for provider review:    None         Sarah Paredes LPN  Chart routed to .

## 2025-04-21 ENCOUNTER — TELEPHONE (OUTPATIENT)
Dept: INTERNAL MEDICINE | Facility: CLINIC | Age: 40
End: 2025-04-21
Payer: COMMERCIAL

## 2025-05-12 ENCOUNTER — MYC REFILL (OUTPATIENT)
Dept: INTERNAL MEDICINE | Facility: CLINIC | Age: 40
End: 2025-05-12
Payer: COMMERCIAL

## 2025-05-12 DIAGNOSIS — E11.8 TYPE 2 DIABETES MELLITUS WITH COMPLICATION, WITHOUT LONG-TERM CURRENT USE OF INSULIN (H): ICD-10-CM

## 2025-05-12 DIAGNOSIS — E11.65 TYPE 2 DIABETES MELLITUS WITH HYPERGLYCEMIA, WITHOUT LONG-TERM CURRENT USE OF INSULIN (H): ICD-10-CM

## 2025-05-20 NOTE — TELEPHONE ENCOUNTER
"Requested Prescriptions   Pending Prescriptions Disp Refills    glimepiride (AMARYL) 4 MG tablet [Pharmacy Med Name: GLIMEPIRIDE 4MG TABS] 90 tablet 3     Sig: TAKE ONE TABLET BY MOUTH EVERY MORNING BEFORE BREAKFAST (NEED OFFICE VISIT FOR ANY FURTHER REFILLS)       Sulfonylurea Agents Failed - 7/19/2023  4:08 PM        Failed - Patient has documented A1c within the specified period of time.     If HgbA1C is 8 or greater, it needs to be on file within the past 3 months.  If less than 8, must be on file within the past 6 months.     Recent Labs   Lab Test 02/03/23  1410   A1C 10.1*             Passed - Medication is active on med list        Passed - Patient is age 18 or older        Passed - Patient has a recent creatinine (normal) within the past 12 mos.     Recent Labs   Lab Test 02/03/23  1410 03/18/19  0914 01/17/18  0823   CR 0.92   < >  --    CREAT  --   --  0.8    < > = values in this interval not displayed.       Ok to refill medication if creatinine is low          Passed - Recent (6 mo) or future (30 days) visit within the authorizing provider's specialty     Patient had office visit in the last 6 months or has a visit in the next 30 days with authorizing provider or within the authorizing provider's specialty.  See \"Patient Info\" tab in inbasket, or \"Choose Columns\" in Meds & Orders section of the refill encounter.                 " Chief Complaint   Patient presents with    Follow-up    Bipolar    Depression    ADHD    Addiction Problem     \"Have you been to the ER, urgent care clinic since your last visit?  Hospitalized since your last visit?\"    NO    “Have you seen or consulted any other health care providers outside our system since your last visit?”    NO    BP 99/67 (BP Site: Left Upper Arm, Patient Position: Sitting, BP Cuff Size: Small Adult)   Pulse 72   Temp 97.8 °F (36.6 °C) (Oral)   Resp 16   Ht 1.499 m (4' 11\")   Wt 57.2 kg (126 lb)   LMP 04/29/2025   SpO2 95%   BMI 25.45 kg/m²

## 2025-06-22 ENCOUNTER — MYC REFILL (OUTPATIENT)
Dept: INTERNAL MEDICINE | Facility: CLINIC | Age: 40
End: 2025-06-22
Payer: COMMERCIAL

## 2025-06-22 DIAGNOSIS — E11.65 TYPE 2 DIABETES MELLITUS WITH HYPERGLYCEMIA, WITHOUT LONG-TERM CURRENT USE OF INSULIN (H): ICD-10-CM

## 2025-06-22 DIAGNOSIS — E11.8 TYPE 2 DIABETES MELLITUS WITH COMPLICATION, WITHOUT LONG-TERM CURRENT USE OF INSULIN (H): ICD-10-CM

## 2025-08-06 ENCOUNTER — MYC REFILL (OUTPATIENT)
Dept: INTERNAL MEDICINE | Facility: CLINIC | Age: 40
End: 2025-08-06
Payer: COMMERCIAL

## 2025-08-06 DIAGNOSIS — E11.65 TYPE 2 DIABETES MELLITUS WITH HYPERGLYCEMIA, WITHOUT LONG-TERM CURRENT USE OF INSULIN (H): ICD-10-CM

## 2025-08-11 ENCOUNTER — MYC REFILL (OUTPATIENT)
Dept: INTERNAL MEDICINE | Facility: CLINIC | Age: 40
End: 2025-08-11
Payer: COMMERCIAL

## 2025-08-11 DIAGNOSIS — E11.8 TYPE 2 DIABETES MELLITUS WITH COMPLICATION, WITHOUT LONG-TERM CURRENT USE OF INSULIN (H): ICD-10-CM
